# Patient Record
Sex: FEMALE | Race: WHITE | NOT HISPANIC OR LATINO | URBAN - METROPOLITAN AREA
[De-identification: names, ages, dates, MRNs, and addresses within clinical notes are randomized per-mention and may not be internally consistent; named-entity substitution may affect disease eponyms.]

---

## 2017-03-21 ENCOUNTER — OUTPATIENT (OUTPATIENT)
Dept: OUTPATIENT SERVICES | Facility: HOSPITAL | Age: 77
LOS: 1 days | Discharge: HOME | End: 2017-03-21

## 2017-06-27 DIAGNOSIS — Z12.31 ENCOUNTER FOR SCREENING MAMMOGRAM FOR MALIGNANT NEOPLASM OF BREAST: ICD-10-CM

## 2018-04-17 ENCOUNTER — OUTPATIENT (OUTPATIENT)
Dept: OUTPATIENT SERVICES | Facility: HOSPITAL | Age: 78
LOS: 1 days | Discharge: HOME | End: 2018-04-17

## 2019-04-23 ENCOUNTER — OUTPATIENT (OUTPATIENT)
Dept: OUTPATIENT SERVICES | Facility: HOSPITAL | Age: 79
LOS: 1 days | Discharge: HOME | End: 2019-04-23
Payer: MEDICARE

## 2019-04-23 DIAGNOSIS — Z12.31 ENCOUNTER FOR SCREENING MAMMOGRAM FOR MALIGNANT NEOPLASM OF BREAST: ICD-10-CM

## 2019-04-23 PROCEDURE — 77067 SCR MAMMO BI INCL CAD: CPT | Mod: 26

## 2019-04-23 PROCEDURE — 77063 BREAST TOMOSYNTHESIS BI: CPT | Mod: 26

## 2019-07-22 ENCOUNTER — APPOINTMENT (OUTPATIENT)
Dept: GASTROENTEROLOGY | Facility: CLINIC | Age: 79
End: 2019-07-22
Payer: MEDICARE

## 2019-07-22 VITALS
WEIGHT: 203 LBS | BODY MASS INDEX: 32.62 KG/M2 | DIASTOLIC BLOOD PRESSURE: 70 MMHG | HEIGHT: 66 IN | SYSTOLIC BLOOD PRESSURE: 140 MMHG | HEART RATE: 71 BPM

## 2019-07-22 DIAGNOSIS — G45.9 TRANSIENT CEREBRAL ISCHEMIC ATTACK, UNSPECIFIED: ICD-10-CM

## 2019-07-22 DIAGNOSIS — Z85.828 PERSONAL HISTORY OF OTHER MALIGNANT NEOPLASM OF SKIN: ICD-10-CM

## 2019-07-22 DIAGNOSIS — Z80.7 FAMILY HISTORY OF OTHER MALIGNANT NEOPLASMS OF LYMPHOID, HEMATOPOIETIC AND RELATED TISSUES: ICD-10-CM

## 2019-07-22 DIAGNOSIS — Z86.79 PERSONAL HISTORY OF OTHER DISEASES OF THE CIRCULATORY SYSTEM: ICD-10-CM

## 2019-07-22 DIAGNOSIS — Z78.9 OTHER SPECIFIED HEALTH STATUS: ICD-10-CM

## 2019-07-22 PROCEDURE — 99204 OFFICE O/P NEW MOD 45 MIN: CPT

## 2019-07-22 RX ORDER — MULTIVIT-MIN/FA/LYCOPEN/LUTEIN .4-300-25
TABLET ORAL
Refills: 0 | Status: ACTIVE | COMMUNITY

## 2019-07-22 NOTE — PHYSICAL EXAM
[General Appearance - Alert] : alert [Sclera] : the sclera and conjunctiva were normal [Thyroid Diffuse Enlargement] : the thyroid was not enlarged [Auscultation Breath Sounds / Voice Sounds] : lungs were clear to auscultation bilaterally [Heart Sounds] : normal S1 and S2 [Bowel Sounds] : normal bowel sounds [Abdomen Soft] : soft [Abdomen Tenderness] : non-tender [] : no hepato-splenomegaly [Abdomen Mass (___ Cm)] : no abdominal mass palpated [No CVA Tenderness] : no ~M costovertebral angle tenderness [Skin Color & Pigmentation] : normal skin color and pigmentation [Oriented To Time, Place, And Person] : oriented to person, place, and time

## 2019-07-22 NOTE — ASSESSMENT
[FreeTextEntry1] : 79 year old female pt average risk for CRC with personal hx of colon polyps at a colonoscopy done 5 years ago by Dr Mckeon, is here for surveillance colonoscopy.\par Denies any GI complaints.\par \par colonoscopy scheduled (traci)

## 2019-07-22 NOTE — HISTORY OF PRESENT ILLNESS
[de-identified] : 79 year old female pt average risk for CRC with personal hx of colon polyps at a colonoscopy done 5 years ago by Dr Olmedo, is here for surveillance colonoscopy.\par Denies any GI complaints.

## 2019-08-06 ENCOUNTER — APPOINTMENT (OUTPATIENT)
Dept: CARDIOLOGY | Facility: CLINIC | Age: 79
End: 2019-08-06
Payer: MEDICARE

## 2019-08-06 PROCEDURE — 93000 ELECTROCARDIOGRAM COMPLETE: CPT

## 2019-08-06 PROCEDURE — 99214 OFFICE O/P EST MOD 30 MIN: CPT

## 2019-09-17 ENCOUNTER — TRANSCRIPTION ENCOUNTER (OUTPATIENT)
Age: 79
End: 2019-09-17

## 2019-09-17 ENCOUNTER — RESULT REVIEW (OUTPATIENT)
Age: 79
End: 2019-09-17

## 2019-09-17 ENCOUNTER — OUTPATIENT (OUTPATIENT)
Dept: OUTPATIENT SERVICES | Facility: HOSPITAL | Age: 79
LOS: 1 days | Discharge: HOME | End: 2019-09-17
Payer: MEDICARE

## 2019-09-17 VITALS — RESPIRATION RATE: 15 BRPM | SYSTOLIC BLOOD PRESSURE: 120 MMHG | HEART RATE: 62 BPM | DIASTOLIC BLOOD PRESSURE: 64 MMHG

## 2019-09-17 VITALS
HEART RATE: 60 BPM | SYSTOLIC BLOOD PRESSURE: 160 MMHG | DIASTOLIC BLOOD PRESSURE: 74 MMHG | WEIGHT: 201.06 LBS | OXYGEN SATURATION: 100 % | TEMPERATURE: 99 F | RESPIRATION RATE: 20 BRPM | HEIGHT: 66 IN

## 2019-09-17 DIAGNOSIS — Z90.49 ACQUIRED ABSENCE OF OTHER SPECIFIED PARTS OF DIGESTIVE TRACT: Chronic | ICD-10-CM

## 2019-09-17 DIAGNOSIS — Z90.710 ACQUIRED ABSENCE OF BOTH CERVIX AND UTERUS: Chronic | ICD-10-CM

## 2019-09-17 PROCEDURE — 88305 TISSUE EXAM BY PATHOLOGIST: CPT | Mod: 26

## 2019-09-17 PROCEDURE — 45380 COLONOSCOPY AND BIOPSY: CPT

## 2019-09-17 NOTE — ASU DISCHARGE PLAN (ADULT/PEDIATRIC) - CARE PROVIDER_API CALL
Lizbeth Armenta)  Internal Medicine  [address] [place] [address]  Phone: [phone]  Fax: [phone]  Follow Up Time:

## 2019-09-17 NOTE — ASU DISCHARGE PLAN (ADULT/PEDIATRIC) - CALL YOUR DOCTOR IF YOU HAVE ANY OF THE FOLLOWING:
Fever greater than (need to indicate Fahrenheit or Celsius)/Bleeding that does not stop/Pain not relieved by Medications
none

## 2019-09-18 LAB — SURGICAL PATHOLOGY STUDY: SIGNIFICANT CHANGE UP

## 2019-09-23 DIAGNOSIS — Z12.11 ENCOUNTER FOR SCREENING FOR MALIGNANT NEOPLASM OF COLON: ICD-10-CM

## 2019-09-23 DIAGNOSIS — D12.0 BENIGN NEOPLASM OF CECUM: ICD-10-CM

## 2019-09-23 DIAGNOSIS — G62.9 POLYNEUROPATHY, UNSPECIFIED: ICD-10-CM

## 2019-09-23 DIAGNOSIS — K64.4 RESIDUAL HEMORRHOIDAL SKIN TAGS: ICD-10-CM

## 2019-09-23 DIAGNOSIS — I10 ESSENTIAL (PRIMARY) HYPERTENSION: ICD-10-CM

## 2019-09-23 DIAGNOSIS — K57.30 DIVERTICULOSIS OF LARGE INTESTINE WITHOUT PERFORATION OR ABSCESS WITHOUT BLEEDING: ICD-10-CM

## 2019-09-23 DIAGNOSIS — Z79.899 OTHER LONG TERM (CURRENT) DRUG THERAPY: ICD-10-CM

## 2019-09-23 DIAGNOSIS — Z88.2 ALLERGY STATUS TO SULFONAMIDES: ICD-10-CM

## 2019-10-25 NOTE — ASU PREOP CHECKLIST - LATEX ALLERGY
"[facility] Refill Protocol.  Steph GUALLPA RN    " no

## 2020-02-04 ENCOUNTER — APPOINTMENT (OUTPATIENT)
Dept: CARDIOLOGY | Facility: CLINIC | Age: 80
End: 2020-02-04
Payer: MEDICARE

## 2020-02-04 PROCEDURE — 93880 EXTRACRANIAL BILAT STUDY: CPT

## 2020-02-25 ENCOUNTER — APPOINTMENT (OUTPATIENT)
Dept: CARDIOLOGY | Facility: CLINIC | Age: 80
End: 2020-02-25
Payer: MEDICARE

## 2020-02-25 PROBLEM — I10 ESSENTIAL (PRIMARY) HYPERTENSION: Chronic | Status: ACTIVE | Noted: 2019-09-17

## 2020-02-25 PROCEDURE — 93000 ELECTROCARDIOGRAM COMPLETE: CPT

## 2020-02-25 PROCEDURE — 99214 OFFICE O/P EST MOD 30 MIN: CPT

## 2020-03-16 NOTE — ASU DISCHARGE PLAN (ADULT/PEDIATRIC) - ASU DISCHARGE DATE/TIME
[FreeTextEntry1] : I, Rehan Coleman, 17-Sep-2019 10:11 17-Sep-2019 11:45

## 2020-10-09 ENCOUNTER — APPOINTMENT (OUTPATIENT)
Dept: CARDIOLOGY | Facility: CLINIC | Age: 80
End: 2020-10-09
Payer: MEDICARE

## 2020-10-09 PROCEDURE — 93306 TTE W/DOPPLER COMPLETE: CPT

## 2020-10-20 ENCOUNTER — APPOINTMENT (OUTPATIENT)
Dept: CARDIOLOGY | Facility: CLINIC | Age: 80
End: 2020-10-20
Payer: MEDICARE

## 2020-10-20 VITALS
SYSTOLIC BLOOD PRESSURE: 122 MMHG | WEIGHT: 211 LBS | BODY MASS INDEX: 33.91 KG/M2 | HEART RATE: 67 BPM | HEIGHT: 66 IN | DIASTOLIC BLOOD PRESSURE: 80 MMHG | TEMPERATURE: 98.6 F

## 2020-10-20 DIAGNOSIS — Z00.00 ENCOUNTER FOR GENERAL ADULT MEDICAL EXAMINATION W/OUT ABNORMAL FINDINGS: ICD-10-CM

## 2020-10-20 PROCEDURE — 99214 OFFICE O/P EST MOD 30 MIN: CPT

## 2020-10-20 PROCEDURE — 93000 ELECTROCARDIOGRAM COMPLETE: CPT

## 2020-10-20 PROCEDURE — 99072 ADDL SUPL MATRL&STAF TM PHE: CPT

## 2020-10-20 RX ORDER — POLYETHYLENE GLYCOL 3350 AND ELECTROLYTES WITH LEMON FLAVOR 236; 22.74; 6.74; 5.86; 2.97 G/4L; G/4L; G/4L; G/4L; G/4L
236 POWDER, FOR SOLUTION ORAL
Qty: 1 | Refills: 0 | Status: COMPLETED | COMMUNITY
Start: 2019-07-22 | End: 2020-10-20

## 2021-04-07 ENCOUNTER — APPOINTMENT (OUTPATIENT)
Dept: NEUROLOGY | Facility: CLINIC | Age: 81
End: 2021-04-07

## 2021-05-11 ENCOUNTER — NON-APPOINTMENT (OUTPATIENT)
Age: 81
End: 2021-05-11

## 2021-05-25 ENCOUNTER — APPOINTMENT (OUTPATIENT)
Dept: CARDIOLOGY | Facility: CLINIC | Age: 81
End: 2021-05-25
Payer: MEDICARE

## 2021-05-25 ENCOUNTER — RESULT CHARGE (OUTPATIENT)
Age: 81
End: 2021-05-25

## 2021-05-25 VITALS
BODY MASS INDEX: 32.95 KG/M2 | WEIGHT: 205 LBS | HEART RATE: 66 BPM | HEIGHT: 66 IN | SYSTOLIC BLOOD PRESSURE: 120 MMHG | DIASTOLIC BLOOD PRESSURE: 70 MMHG | TEMPERATURE: 98 F

## 2021-05-25 PROCEDURE — 99213 OFFICE O/P EST LOW 20 MIN: CPT

## 2021-05-25 PROCEDURE — 93000 ELECTROCARDIOGRAM COMPLETE: CPT

## 2021-06-10 ENCOUNTER — OUTPATIENT (OUTPATIENT)
Dept: OUTPATIENT SERVICES | Facility: HOSPITAL | Age: 81
LOS: 1 days | Discharge: HOME | End: 2021-06-10
Payer: MEDICARE

## 2021-06-10 VITALS
HEART RATE: 60 BPM | WEIGHT: 205.03 LBS | TEMPERATURE: 97 F | SYSTOLIC BLOOD PRESSURE: 195 MMHG | DIASTOLIC BLOOD PRESSURE: 76 MMHG | HEIGHT: 66 IN | RESPIRATION RATE: 17 BRPM | OXYGEN SATURATION: 96 %

## 2021-06-10 DIAGNOSIS — Z90.710 ACQUIRED ABSENCE OF BOTH CERVIX AND UTERUS: Chronic | ICD-10-CM

## 2021-06-10 DIAGNOSIS — S83.231D COMPLEX TEAR OF MEDIAL MENISCUS, CURRENT INJURY, RIGHT KNEE, SUBSEQUENT ENCOUNTER: ICD-10-CM

## 2021-06-10 DIAGNOSIS — Z90.49 ACQUIRED ABSENCE OF OTHER SPECIFIED PARTS OF DIGESTIVE TRACT: Chronic | ICD-10-CM

## 2021-06-10 DIAGNOSIS — Z98.890 OTHER SPECIFIED POSTPROCEDURAL STATES: Chronic | ICD-10-CM

## 2021-06-10 DIAGNOSIS — Z01.818 ENCOUNTER FOR OTHER PREPROCEDURAL EXAMINATION: ICD-10-CM

## 2021-06-10 LAB
ALBUMIN SERPL ELPH-MCNC: 4.3 G/DL — SIGNIFICANT CHANGE UP (ref 3.5–5.2)
ALP SERPL-CCNC: 51 U/L — SIGNIFICANT CHANGE UP (ref 30–115)
ALT FLD-CCNC: 17 U/L — SIGNIFICANT CHANGE UP (ref 0–41)
ANION GAP SERPL CALC-SCNC: 7 MMOL/L — SIGNIFICANT CHANGE UP (ref 7–14)
APTT BLD: 28 SEC — SIGNIFICANT CHANGE UP (ref 27–39.2)
AST SERPL-CCNC: 21 U/L — SIGNIFICANT CHANGE UP (ref 0–41)
BASOPHILS # BLD AUTO: 0.05 K/UL — SIGNIFICANT CHANGE UP (ref 0–0.2)
BASOPHILS NFR BLD AUTO: 0.6 % — SIGNIFICANT CHANGE UP (ref 0–1)
BILIRUB SERPL-MCNC: 0.3 MG/DL — SIGNIFICANT CHANGE UP (ref 0.2–1.2)
BUN SERPL-MCNC: 19 MG/DL — SIGNIFICANT CHANGE UP (ref 10–20)
CALCIUM SERPL-MCNC: 9.7 MG/DL — SIGNIFICANT CHANGE UP (ref 8.5–10.1)
CHLORIDE SERPL-SCNC: 103 MMOL/L — SIGNIFICANT CHANGE UP (ref 98–110)
CO2 SERPL-SCNC: 29 MMOL/L — SIGNIFICANT CHANGE UP (ref 17–32)
CREAT SERPL-MCNC: 0.9 MG/DL — SIGNIFICANT CHANGE UP (ref 0.7–1.5)
EOSINOPHIL # BLD AUTO: 0.17 K/UL — SIGNIFICANT CHANGE UP (ref 0–0.7)
EOSINOPHIL NFR BLD AUTO: 1.9 % — SIGNIFICANT CHANGE UP (ref 0–8)
GLUCOSE SERPL-MCNC: 79 MG/DL — SIGNIFICANT CHANGE UP (ref 70–99)
HCT VFR BLD CALC: 40 % — SIGNIFICANT CHANGE UP (ref 37–47)
HGB BLD-MCNC: 13.1 G/DL — SIGNIFICANT CHANGE UP (ref 12–16)
IMM GRANULOCYTES NFR BLD AUTO: 0.3 % — SIGNIFICANT CHANGE UP (ref 0.1–0.3)
INR BLD: 0.97 RATIO — SIGNIFICANT CHANGE UP (ref 0.65–1.3)
LYMPHOCYTES # BLD AUTO: 2.2 K/UL — SIGNIFICANT CHANGE UP (ref 1.2–3.4)
LYMPHOCYTES # BLD AUTO: 24.4 % — SIGNIFICANT CHANGE UP (ref 20.5–51.1)
MCHC RBC-ENTMCNC: 29.6 PG — SIGNIFICANT CHANGE UP (ref 27–31)
MCHC RBC-ENTMCNC: 32.8 G/DL — SIGNIFICANT CHANGE UP (ref 32–37)
MCV RBC AUTO: 90.3 FL — SIGNIFICANT CHANGE UP (ref 81–99)
MONOCYTES # BLD AUTO: 0.92 K/UL — HIGH (ref 0.1–0.6)
MONOCYTES NFR BLD AUTO: 10.2 % — HIGH (ref 1.7–9.3)
NEUTROPHILS # BLD AUTO: 5.66 K/UL — SIGNIFICANT CHANGE UP (ref 1.4–6.5)
NEUTROPHILS NFR BLD AUTO: 62.6 % — SIGNIFICANT CHANGE UP (ref 42.2–75.2)
NRBC # BLD: 0 /100 WBCS — SIGNIFICANT CHANGE UP (ref 0–0)
PLATELET # BLD AUTO: 256 K/UL — SIGNIFICANT CHANGE UP (ref 130–400)
POTASSIUM SERPL-MCNC: 4.5 MMOL/L — SIGNIFICANT CHANGE UP (ref 3.5–5)
POTASSIUM SERPL-SCNC: 4.5 MMOL/L — SIGNIFICANT CHANGE UP (ref 3.5–5)
PROT SERPL-MCNC: 6.8 G/DL — SIGNIFICANT CHANGE UP (ref 6–8)
PROTHROM AB SERPL-ACNC: 11.1 SEC — SIGNIFICANT CHANGE UP (ref 9.95–12.87)
RBC # BLD: 4.43 M/UL — SIGNIFICANT CHANGE UP (ref 4.2–5.4)
RBC # FLD: 13.4 % — SIGNIFICANT CHANGE UP (ref 11.5–14.5)
SODIUM SERPL-SCNC: 139 MMOL/L — SIGNIFICANT CHANGE UP (ref 135–146)
WBC # BLD: 9.03 K/UL — SIGNIFICANT CHANGE UP (ref 4.8–10.8)
WBC # FLD AUTO: 9.03 K/UL — SIGNIFICANT CHANGE UP (ref 4.8–10.8)

## 2021-06-10 PROCEDURE — 93010 ELECTROCARDIOGRAM REPORT: CPT

## 2021-06-10 PROCEDURE — 71046 X-RAY EXAM CHEST 2 VIEWS: CPT | Mod: 26

## 2021-06-10 NOTE — H&P PST ADULT - REASON FOR ADMISSION
80 Y/O FEMALE HERE FOR PRE-ADMISSION SURGICAL TESTING. PATIENT REPORTS SHE HAS BEEN C/O RIGHT KNEE PAIN SINCE 11/2019. HAD A CORTISONE INJECTION DONE AT THAT TIME AND A GEL INJECTION IN 12/2020. PAIN PERSISTED. MRI 5/28/21 REVEALED + TORN MENISCUS RIGHT KNEE.  NOW FOR SCHEDULED PROCEDURE.

## 2021-06-10 NOTE — H&P PST ADULT - NSICDXPASTMEDICALHX_GEN_ALL_CORE_FT
PAST MEDICAL HISTORY:  Afib AGE 72 X1 EPISODE    Asymptomatic hypertension     Early stage skin cancer SQAMOUS AND BASAL CELL SKIN CA    Low back pain BULGING DISCS LUMBAR SPINE    Mitral regurgitation     OA (osteoarthritis)     Peripheral neuropathy

## 2021-06-10 NOTE — H&P PST ADULT - NSICDXPASTSURGICALHX_GEN_ALL_CORE_FT
PAST SURGICAL HISTORY:  H/O abdominal hysterectomy     History of cholecystectomy     History of surgery REMOVAL OF RIGHT ANKLE SKIN CANCER

## 2021-06-10 NOTE — H&P PST ADULT - NSICDXFAMILYHX_GEN_ALL_CORE_FT
FAMILY HISTORY:  Father  Still living? No  FH: renal failure, Age at diagnosis: Age Unknown    Mother  Still living? No  FH: kidney cancer, Age at diagnosis: Age Unknown  FH: lung cancer, Age at diagnosis: Age Unknown

## 2021-06-10 NOTE — H&P PST ADULT - HISTORY OF PRESENT ILLNESS
PATIENT DENIES CHEST PAIN, SHORTNESS OF BREATH, PALPITATIONS, COUGHING, FEVER, DYSURIA.  CAN WALK UP 2-3 FLIGHTS OF STEPS WITHOUT SOB, ONLY LIMITED TO KNEE AND BACK PAIN.    NO COUGH, FEVER, SORE THROAT, HEADACHE, LOSS OF TASTE OR SMELL. NO KNOWN EXPOSURE TO ANYONE WITH COVID. PATIENT WAS INSTRUCTED TO ISOLATE FROM NOW UNTIL THE SURGERY.  SHE IS FULLY VACCINATED WITH MODERNA    Anesthesia Alert  + Difficult Airway (CLASS IV)  NO--History of neck surgery or radiation  NO--Limited ROM of neck  NO--History of Malignant hyperthermia  NO--Personal or family history of Pseudocholinesterase deficiency  NO--Prior Anesthesia Complication  NO--Latex Allergy  NO--Loose teeth  NO--History of Rheumatoid Arthritis  ?? YOLY (+ SNORRING)  On baby ASA.

## 2021-06-10 NOTE — H&P PST ADULT - NSANTHOSAYNRD_GEN_A_CORE
No. YOLY screening performed.  STOP BANG Legend: 0-2 = LOW Risk; 3-4 = INTERMEDIATE Risk; 5-8 = HIGH Risk

## 2021-06-15 ENCOUNTER — LABORATORY RESULT (OUTPATIENT)
Age: 81
End: 2021-06-15

## 2021-06-15 ENCOUNTER — OUTPATIENT (OUTPATIENT)
Dept: OUTPATIENT SERVICES | Facility: HOSPITAL | Age: 81
LOS: 1 days | Discharge: HOME | End: 2021-06-15

## 2021-06-15 DIAGNOSIS — Z90.49 ACQUIRED ABSENCE OF OTHER SPECIFIED PARTS OF DIGESTIVE TRACT: Chronic | ICD-10-CM

## 2021-06-15 DIAGNOSIS — Z11.59 ENCOUNTER FOR SCREENING FOR OTHER VIRAL DISEASES: ICD-10-CM

## 2021-06-15 DIAGNOSIS — Z98.890 OTHER SPECIFIED POSTPROCEDURAL STATES: Chronic | ICD-10-CM

## 2021-06-15 DIAGNOSIS — Z90.710 ACQUIRED ABSENCE OF BOTH CERVIX AND UTERUS: Chronic | ICD-10-CM

## 2021-06-15 PROBLEM — M54.5 LOW BACK PAIN: Chronic | Status: ACTIVE | Noted: 2021-06-10

## 2021-06-15 PROBLEM — I34.0 NONRHEUMATIC MITRAL (VALVE) INSUFFICIENCY: Chronic | Status: ACTIVE | Noted: 2021-06-10

## 2021-06-15 PROBLEM — G62.9 POLYNEUROPATHY, UNSPECIFIED: Chronic | Status: ACTIVE | Noted: 2021-06-10

## 2021-06-15 PROBLEM — I48.91 UNSPECIFIED ATRIAL FIBRILLATION: Chronic | Status: ACTIVE | Noted: 2019-09-17

## 2021-06-15 PROBLEM — M19.90 UNSPECIFIED OSTEOARTHRITIS, UNSPECIFIED SITE: Chronic | Status: ACTIVE | Noted: 2021-06-10

## 2021-06-15 PROBLEM — D04.9 CARCINOMA IN SITU OF SKIN, UNSPECIFIED: Chronic | Status: ACTIVE | Noted: 2019-09-17

## 2021-06-17 NOTE — ASU PATIENT PROFILE, ADULT - NS TRANSFER PATIENT BELONGINGS
Pt arrives to ED with c/o migraine starting Monday evening and vomiting.  Known hx of migraines and chronic pain, prescribed hydrocodone but cannot keep anything down.  Out of her prescription of zofran.   
walker, purse/Other belongings/Clothing

## 2021-06-17 NOTE — ASU PATIENT PROFILE, ADULT - PSH
H/O abdominal hysterectomy    History of cholecystectomy    History of surgery  REMOVAL OF RIGHT ANKLE SKIN CANCER  History of tonsillectomy

## 2021-06-17 NOTE — ASU PATIENT PROFILE, ADULT - PMH
Afib  AGE 72 X1 EPISODE  Asymptomatic hypertension    Early stage skin cancer  SQAMOUS AND BASAL CELL SKIN CA  Low back pain  BULGING DISCS LUMBAR SPINE  Mitral regurgitation    Murmur, heart    OA (osteoarthritis)    Peripheral neuropathy

## 2021-06-18 ENCOUNTER — RESULT REVIEW (OUTPATIENT)
Age: 81
End: 2021-06-18

## 2021-06-18 ENCOUNTER — OUTPATIENT (OUTPATIENT)
Dept: OUTPATIENT SERVICES | Facility: HOSPITAL | Age: 81
LOS: 1 days | Discharge: HOME | End: 2021-06-18
Payer: MEDICARE

## 2021-06-18 VITALS
DIASTOLIC BLOOD PRESSURE: 71 MMHG | OXYGEN SATURATION: 96 % | HEART RATE: 69 BPM | WEIGHT: 205.03 LBS | HEIGHT: 66 IN | SYSTOLIC BLOOD PRESSURE: 160 MMHG | RESPIRATION RATE: 18 BRPM | TEMPERATURE: 98 F

## 2021-06-18 VITALS
RESPIRATION RATE: 18 BRPM | SYSTOLIC BLOOD PRESSURE: 146 MMHG | HEART RATE: 76 BPM | OXYGEN SATURATION: 93 % | DIASTOLIC BLOOD PRESSURE: 65 MMHG

## 2021-06-18 DIAGNOSIS — Z90.710 ACQUIRED ABSENCE OF BOTH CERVIX AND UTERUS: Chronic | ICD-10-CM

## 2021-06-18 DIAGNOSIS — Z90.49 ACQUIRED ABSENCE OF OTHER SPECIFIED PARTS OF DIGESTIVE TRACT: Chronic | ICD-10-CM

## 2021-06-18 DIAGNOSIS — Z98.890 OTHER SPECIFIED POSTPROCEDURAL STATES: Chronic | ICD-10-CM

## 2021-06-18 DIAGNOSIS — Z90.89 ACQUIRED ABSENCE OF OTHER ORGANS: Chronic | ICD-10-CM

## 2021-06-18 PROCEDURE — 88304 TISSUE EXAM BY PATHOLOGIST: CPT | Mod: 26

## 2021-06-18 RX ORDER — MORPHINE SULFATE 50 MG/1
2 CAPSULE, EXTENDED RELEASE ORAL EVERY 6 HOURS
Refills: 0 | Status: DISCONTINUED | OUTPATIENT
Start: 2021-06-18 | End: 2021-06-18

## 2021-06-18 RX ORDER — SODIUM CHLORIDE 9 MG/ML
1000 INJECTION, SOLUTION INTRAVENOUS
Refills: 0 | Status: DISCONTINUED | OUTPATIENT
Start: 2021-06-18 | End: 2021-07-02

## 2021-06-18 RX ORDER — OXYCODONE AND ACETAMINOPHEN 5; 325 MG/1; MG/1
1 TABLET ORAL EVERY 4 HOURS
Refills: 0 | Status: DISCONTINUED | OUTPATIENT
Start: 2021-06-18 | End: 2021-06-18

## 2021-06-18 RX ORDER — ACETAMINOPHEN 500 MG
1000 TABLET ORAL ONCE
Refills: 0 | Status: COMPLETED | OUTPATIENT
Start: 2021-06-18 | End: 2021-06-18

## 2021-06-18 RX ORDER — ONDANSETRON 8 MG/1
4 TABLET, FILM COATED ORAL ONCE
Refills: 0 | Status: DISCONTINUED | OUTPATIENT
Start: 2021-06-18 | End: 2021-07-02

## 2021-06-18 RX ORDER — HYDROMORPHONE HYDROCHLORIDE 2 MG/ML
0.5 INJECTION INTRAMUSCULAR; INTRAVENOUS; SUBCUTANEOUS
Refills: 0 | Status: DISCONTINUED | OUTPATIENT
Start: 2021-06-18 | End: 2021-06-18

## 2021-06-18 RX ORDER — OXYCODONE HYDROCHLORIDE 5 MG/1
5 TABLET ORAL ONCE
Refills: 0 | Status: DISCONTINUED | OUTPATIENT
Start: 2021-06-18 | End: 2021-06-18

## 2021-06-18 RX ADMIN — HYDROMORPHONE HYDROCHLORIDE 0.5 MILLIGRAM(S): 2 INJECTION INTRAMUSCULAR; INTRAVENOUS; SUBCUTANEOUS at 14:48

## 2021-06-18 RX ADMIN — OXYCODONE HYDROCHLORIDE 5 MILLIGRAM(S): 5 TABLET ORAL at 15:48

## 2021-06-18 RX ADMIN — HYDROMORPHONE HYDROCHLORIDE 0.5 MILLIGRAM(S): 2 INJECTION INTRAMUSCULAR; INTRAVENOUS; SUBCUTANEOUS at 14:49

## 2021-06-18 RX ADMIN — Medication 1000 MILLIGRAM(S): at 15:45

## 2021-06-18 RX ADMIN — SODIUM CHLORIDE 100 MILLILITER(S): 9 INJECTION, SOLUTION INTRAVENOUS at 15:01

## 2021-06-18 RX ADMIN — HYDROMORPHONE HYDROCHLORIDE 0.5 MILLIGRAM(S): 2 INJECTION INTRAMUSCULAR; INTRAVENOUS; SUBCUTANEOUS at 15:01

## 2021-06-18 RX ADMIN — Medication 400 MILLIGRAM(S): at 14:57

## 2021-06-18 RX ADMIN — HYDROMORPHONE HYDROCHLORIDE 0.5 MILLIGRAM(S): 2 INJECTION INTRAMUSCULAR; INTRAVENOUS; SUBCUTANEOUS at 14:37

## 2021-06-18 NOTE — ASU DISCHARGE PLAN (ADULT/PEDIATRIC) - ASU DC SPECIAL INSTRUCTIONSFT
Please maintain your dressing for 48 hours, it may be removed on Sunday and you can shower at that time.     You may put band-aids over your stitches on Sunday.

## 2021-06-18 NOTE — CHART NOTE - NSCHARTNOTEFT_GEN_A_CORE
PACU ANESTHESIA ADMISSION NOTE      Procedure: Knee arthroscopy      Post op diagnosis:  Medial meniscus tear        ____  Intubated  TV:______       Rate: ______      FiO2: ______    __x__  Patent Airway    __x__  Full return of protective reflexes    __x__  Full recovery from anesthesia / back to baseline     Vitals:   T:  97.5        R:  16                BP:     163/68             Sat:   95%               P: 85      Mental Status:  __x__ Awake   __x___ Alert   _____ Drowsy   _____ Sedated    Nausea/Vomiting:  __x__ NO  ______Yes,   See Post - Op Orders          Pain Scale (0-10):  __0___    Treatment: ____ None    ___x_ See Post - Op/PCA Orders    Post - Operative Fluids:   ____ Oral   __x__ See Post - Op Orders    Plan: Discharge:   __x__Home       _____Floor     _____Critical Care    _____  Other:_________________    Comments:  pt tolerated procedure well, pt transferred to PACU and report was given to PACU RN, vital signs are stable and pt shows no signs of distress. no anesthesia complications, discharge pt when criteria met.

## 2021-06-23 LAB — SURGICAL PATHOLOGY STUDY: SIGNIFICANT CHANGE UP

## 2021-06-24 DIAGNOSIS — I34.0 NONRHEUMATIC MITRAL (VALVE) INSUFFICIENCY: ICD-10-CM

## 2021-06-24 DIAGNOSIS — Z79.82 LONG TERM (CURRENT) USE OF ASPIRIN: ICD-10-CM

## 2021-06-24 DIAGNOSIS — I10 ESSENTIAL (PRIMARY) HYPERTENSION: ICD-10-CM

## 2021-06-24 DIAGNOSIS — M17.11 UNILATERAL PRIMARY OSTEOARTHRITIS, RIGHT KNEE: ICD-10-CM

## 2021-06-24 DIAGNOSIS — Z91.040 LATEX ALLERGY STATUS: ICD-10-CM

## 2021-06-24 DIAGNOSIS — Z85.828 PERSONAL HISTORY OF OTHER MALIGNANT NEOPLASM OF SKIN: ICD-10-CM

## 2021-06-24 DIAGNOSIS — I48.91 UNSPECIFIED ATRIAL FIBRILLATION: ICD-10-CM

## 2021-06-24 DIAGNOSIS — R01.1 CARDIAC MURMUR, UNSPECIFIED: ICD-10-CM

## 2021-06-24 DIAGNOSIS — M23.221 DERANGEMENT OF POSTERIOR HORN OF MEDIAL MENISCUS DUE TO OLD TEAR OR INJURY, RIGHT KNEE: ICD-10-CM

## 2021-06-24 DIAGNOSIS — G62.9 POLYNEUROPATHY, UNSPECIFIED: ICD-10-CM

## 2021-06-24 DIAGNOSIS — M23.261 DERANGEMENT OF OTHER LATERAL MENISCUS DUE TO OLD TEAR OR INJURY, RIGHT KNEE: ICD-10-CM

## 2021-06-24 DIAGNOSIS — Z88.2 ALLERGY STATUS TO SULFONAMIDES: ICD-10-CM

## 2021-09-22 ENCOUNTER — OUTPATIENT (OUTPATIENT)
Dept: OUTPATIENT SERVICES | Facility: HOSPITAL | Age: 81
LOS: 1 days | Discharge: HOME | End: 2021-09-22
Payer: MEDICARE

## 2021-09-22 DIAGNOSIS — Z12.31 ENCOUNTER FOR SCREENING MAMMOGRAM FOR MALIGNANT NEOPLASM OF BREAST: ICD-10-CM

## 2021-09-22 DIAGNOSIS — Z90.710 ACQUIRED ABSENCE OF BOTH CERVIX AND UTERUS: Chronic | ICD-10-CM

## 2021-09-22 DIAGNOSIS — Z98.890 OTHER SPECIFIED POSTPROCEDURAL STATES: Chronic | ICD-10-CM

## 2021-09-22 DIAGNOSIS — Z90.89 ACQUIRED ABSENCE OF OTHER ORGANS: Chronic | ICD-10-CM

## 2021-09-22 DIAGNOSIS — Z90.49 ACQUIRED ABSENCE OF OTHER SPECIFIED PARTS OF DIGESTIVE TRACT: Chronic | ICD-10-CM

## 2021-09-22 PROBLEM — R01.1 CARDIAC MURMUR, UNSPECIFIED: Chronic | Status: ACTIVE | Noted: 2021-06-18

## 2021-09-22 PROCEDURE — 77067 SCR MAMMO BI INCL CAD: CPT | Mod: 26

## 2021-09-22 PROCEDURE — 77063 BREAST TOMOSYNTHESIS BI: CPT | Mod: 26

## 2021-10-01 NOTE — H&P PST ADULT - OCCUPATION
Patient is calling and asking for a prescription for Acutane for her acne. Patient was advised that we would ask .  Please call and advise patient at [phone] RETIRED

## 2021-10-13 ENCOUNTER — APPOINTMENT (OUTPATIENT)
Dept: CARDIOLOGY | Facility: CLINIC | Age: 81
End: 2021-10-13
Payer: MEDICARE

## 2021-10-13 ENCOUNTER — TRANSCRIPTION ENCOUNTER (OUTPATIENT)
Age: 81
End: 2021-10-13

## 2021-10-13 PROCEDURE — 93306 TTE W/DOPPLER COMPLETE: CPT

## 2021-10-28 ENCOUNTER — APPOINTMENT (OUTPATIENT)
Dept: CARDIOLOGY | Facility: CLINIC | Age: 81
End: 2021-10-28
Payer: MEDICARE

## 2021-10-28 ENCOUNTER — RESULT CHARGE (OUTPATIENT)
Age: 81
End: 2021-10-28

## 2021-10-28 VITALS
HEIGHT: 66 IN | BODY MASS INDEX: 32.95 KG/M2 | WEIGHT: 205 LBS | DIASTOLIC BLOOD PRESSURE: 70 MMHG | HEART RATE: 66 BPM | OXYGEN SATURATION: 98 % | TEMPERATURE: 98.2 F | SYSTOLIC BLOOD PRESSURE: 120 MMHG

## 2021-10-28 PROCEDURE — 99213 OFFICE O/P EST LOW 20 MIN: CPT

## 2021-10-28 PROCEDURE — 93000 ELECTROCARDIOGRAM COMPLETE: CPT

## 2021-10-28 RX ORDER — ENALAPRIL MALEATE 10 MG/1
10 TABLET ORAL DAILY
Refills: 0 | Status: ACTIVE | COMMUNITY

## 2021-10-28 RX ORDER — ASPIRIN 325 MG
81 TABLET ORAL DAILY
Refills: 0 | Status: ACTIVE | COMMUNITY

## 2021-11-02 ENCOUNTER — APPOINTMENT (OUTPATIENT)
Dept: UROGYNECOLOGY | Facility: CLINIC | Age: 81
End: 2021-11-02
Payer: MEDICARE

## 2021-11-02 VITALS
HEIGHT: 66 IN | BODY MASS INDEX: 33.59 KG/M2 | SYSTOLIC BLOOD PRESSURE: 145 MMHG | WEIGHT: 209 LBS | HEART RATE: 84 BPM | DIASTOLIC BLOOD PRESSURE: 84 MMHG

## 2021-11-02 DIAGNOSIS — Z87.440 PERSONAL HISTORY OF URINARY (TRACT) INFECTIONS: ICD-10-CM

## 2021-11-02 DIAGNOSIS — Z82.49 FAMILY HISTORY OF ISCHEMIC HEART DISEASE AND OTHER DISEASES OF THE CIRCULATORY SYSTEM: ICD-10-CM

## 2021-11-02 PROCEDURE — 99204 OFFICE O/P NEW MOD 45 MIN: CPT | Mod: 25

## 2021-11-02 PROCEDURE — 51701 INSERT BLADDER CATHETER: CPT

## 2021-11-02 NOTE — PHYSICAL EXAM
[Chaperone Present] : A chaperone was present in the examining room during all aspects of the physical examination [FreeTextEntry1] : Void: 50 cc\par \par PVR: 60 cc\par \par Urethra was prepped in sterile fashion and then a sterile catheter was used by me to drain the bladder.\par \par Well healed incision: RUQ\par \par neg empty cough stress test\par \par + atrophy\par \par no urethral caruncle\par \par no vestibular tenderness\par \par + small vaginal polyp just proximal tot the urethral meatus\par \par no prolapse\par \par + urethral hypermobility\par \par no pelvic floor dysfunction\par \par no urethral tenderness\par \par no bladder tenderness\par \par surgically absent cervix\par \par surgically absent uterus\par \par good sphincter tone\par \par decreased rectal squeeze\par \par intact sacral nerves\par \par 1/5 Kegel\par

## 2021-11-02 NOTE — ASSESSMENT
[FreeTextEntry1] : Overactive bladder syndrome -\par Discussed etiology of the condition with the patient. Discussed management options, including first line management options consisting of diet and lifestyle modifications, second line options consisting of medications, and third line options. Patient will start with bladder diet and pelvic floor PT. She will return in 3 months for reevaluation.\par \par Vaginal atrophy -\par Discussed etiology and treatment options with patient. Discussed R/B/A of estrogen vaginal cream. Patient will start using as follows:\par Place a pea size (0.5 grams or less) dab of Estrogen vaginal cream using finger (NOT the applicator) into the vagina 3 times a week ( Monday, Wednesday, Friday)\par \par Nocturia - \par Discussed with patient importance of stopping all fluid intake at least 2-3 hours before bedtime.\par

## 2021-11-02 NOTE — COUNSELING
[FreeTextEntry1] : Please return in about 3 months.\par \par \par Here is the information for the pelvic floor physical therapy place:\par [facility] \par [address]\[place] [address]\par \par \par Place a pea size (0.5 grams or less) dab of Estrogen vaginal cream using finger (NOT the applicator) into the vagina 3 times a week ( Monday, Wednesday, Friday)\par \par \par To reduce urinary frequency at night, please restrict all fluid intake 2-3 hours prior to bedtime.\par \par \par For Urgency, Frequency and Urge related incontinence.\par \par Please decrease or stop the use of the following:\par \par 1. Coffee (Caffeinated and decaffeinated)\par \par 2. Teas (Caffeinated, decaffeinated, Ice tea, and green teas\par \par 3. All sodas (Caffeinated, decaffeinated, energy drinks)\par \par 4. All carbonated drinks including seltzer water\par \par 5. All citric fruit juices\par \par 6. Water with lemon or lime\par \par 7. Spicy foods\par \par 8. Tomato Sauce based foods\par \par 9. Chocolate and chocolate containing products\par \par 10. All alcohol\par

## 2021-11-02 NOTE — HISTORY OF PRESENT ILLNESS
[FreeTextEntry1] : 81 year para 3 ( x3) presents with complaints of not being able to control her bladder and not being able to sleep through the night. She has a hard time getting to the bathroom on time. This has been going on for several years. She has tried kegel exercises.\par \par Pelvic organ prolapse: no bulge, no pressure/heaviness\par \par Stress urinary incontinence: 0 x/week  no prior incontinence procedures\par \par Overactive bladder syndrome: daily frequency 10 x/day, 3-4 x/night,  + urgency,  7-14 x/week UUI episodes,    6-7 pads/day     Bladder irritants include coffee, tea, lemon water,    Prior OAB meds no\par \par Voiding dysfunction: no Incomplete bladder emptying, no hesitancy\par \par Lower urinary tract/vaginal symptoms: 1 UTIs per year, no hematuria, + dysuria, no bladder pain\par \par 7-14 BM/week   occasional constipation   Fecal incontinence no\par \par Sexually active no    Pelvic pain no    Vaginal dryness no    LMP \par \par Hx of hysterectomy in  for heavy bleeding\par

## 2021-11-03 LAB
APPEARANCE: CLEAR
BILIRUBIN URINE: NEGATIVE
BLOOD URINE: NEGATIVE
COLOR: YELLOW
GLUCOSE QUALITATIVE U: NEGATIVE
KETONES URINE: NEGATIVE
LEUKOCYTE ESTERASE URINE: NEGATIVE
NITRITE URINE: NEGATIVE
PH URINE: 5.5
PROTEIN URINE: NEGATIVE
SPECIFIC GRAVITY URINE: 1.02
UROBILINOGEN URINE: NORMAL

## 2021-11-08 LAB — URINE CULTURE <10: ABNORMAL

## 2022-02-08 ENCOUNTER — APPOINTMENT (OUTPATIENT)
Dept: UROGYNECOLOGY | Facility: CLINIC | Age: 82
End: 2022-02-08
Payer: MEDICARE

## 2022-02-08 VITALS
WEIGHT: 209 LBS | DIASTOLIC BLOOD PRESSURE: 82 MMHG | SYSTOLIC BLOOD PRESSURE: 143 MMHG | BODY MASS INDEX: 33.59 KG/M2 | HEART RATE: 81 BPM | HEIGHT: 66 IN

## 2022-02-08 VITALS — DIASTOLIC BLOOD PRESSURE: 82 MMHG | SYSTOLIC BLOOD PRESSURE: 127 MMHG | HEART RATE: 80 BPM

## 2022-02-08 PROCEDURE — 99215 OFFICE O/P EST HI 40 MIN: CPT

## 2022-02-08 NOTE — HISTORY OF PRESENT ILLNESS
[FreeTextEntry1] : 82 year old para 3 with urinary urgency, UUI, nocturia, and vaginal atrophy.\par Positive glaucoma, Dr. Solorzano is her ophthalmologist.  Decreased bladder irritants which has mildly improved symptoms. \par  \par Voids 10 / day, 4-5/night.  positive urgency, 0 MARKUS/week, 7-14 UUI/week, 1? UTI's in past 6 months,    follows bladder diet.\par 7 BM/week. no FI, no fiber, no Miralax \par no vaginal estrogen. no vaginal bleeding, no vaginal/pelvic pain. \par  \par Med/Surg Hx\par Hx of hysterectomy in  for heavy bleeding\par \par 2022 urine culture positive, s/p antibiotics\par \par From initial visit:\par 81 year para 3 ( x3) presents with complaints of not being able to control her bladder and not being able to sleep through the night. She has a hard time getting to the bathroom on time. This has been going on for several years. She has tried kegel exercises.\par \par Pelvic organ prolapse: no bulge, no pressure/heaviness\par \par Stress urinary incontinence: 0 x/week  no prior incontinence procedures\par \par Overactive bladder syndrome: daily frequency 10 x/day, 3-4 x/night,  + urgency,  7-14 x/week UUI episodes,    6-7 pads/day     Bladder irritants include coffee, tea, lemon water,    Prior OAB meds no\par \par Voiding dysfunction: no Incomplete bladder emptying, no hesitancy\par \par Lower urinary tract/vaginal symptoms: 1 UTIs per year, no hematuria, + dysuria, no bladder pain\par \par 7-14 BM/week   occasional constipation   Fecal incontinence no\par \par Sexually active no    Pelvic pain no    Vaginal dryness no    LMP \par \par Hx of hysterectomy in  for heavy bleeding\par

## 2022-02-08 NOTE — ASSESSMENT
[FreeTextEntry1] : Overactive bladder syndrome -\par Patient has not been able to use vaginal estrogen cream but has been trying to follow the bladder diet. Her symptoms are largely unchanged from before. We discussed second line treatment options today. R/B/A of anticholinergic use as well as b-3 agonist use were discussed with the patient. She would like a trial of Myrbetriq. Rx for Myrbetriq 25mg daily was sent to her pharmacy with a 90 day supply. Patient was instructed to measure her BP about 2 weeks after starting the medication. She will return in around 6 weeks for follow up.\par \par Vaginal atrophy -\par Patient unable to use vaginal estrogen cream. May be a candidate for estring down the line.\par \par

## 2022-02-08 NOTE — COUNSELING
[FreeTextEntry1] : Please return in about 6 weeks.\par \par Please start Myrbetriq 25mg daily and make sure to take your blood pressure 2 weeks after starting it. If your blood pressure is high, then please call the office right away and stop taking the medication.\par \par For Urgency, Frequency and Urge related incontinence.\par \par Please decrease or stop the use of the following:\par \par 1. Coffee (Caffeinated and decaffeinated)\par \par 2. Teas (Caffeinated, decaffeinated, Ice tea, and green teas\par \par 3. All sodas (Caffeinated, decaffeinated, energy drinks)\par \par 4. All carbonated drinks including seltzer water\par \par 5. All citric fruit juices\par \par 6. Water with lemon or lime\par \par 7. Spicy foods\par \par 8. Tomato Sauce based foods\par \par 9. Chocolate and chocolate containing products\par \par 10. All alcohol\par

## 2022-06-08 ENCOUNTER — APPOINTMENT (OUTPATIENT)
Dept: CARDIOLOGY | Facility: CLINIC | Age: 82
End: 2022-06-08
Payer: MEDICARE

## 2022-06-08 VITALS
HEIGHT: 66 IN | DIASTOLIC BLOOD PRESSURE: 80 MMHG | SYSTOLIC BLOOD PRESSURE: 138 MMHG | WEIGHT: 210 LBS | BODY MASS INDEX: 33.75 KG/M2

## 2022-06-08 PROCEDURE — 93000 ELECTROCARDIOGRAM COMPLETE: CPT

## 2022-06-08 PROCEDURE — 99213 OFFICE O/P EST LOW 20 MIN: CPT

## 2022-06-08 RX ORDER — ESTRADIOL 0.1 MG/G
0.1 CREAM VAGINAL
Qty: 1 | Refills: 6 | Status: DISCONTINUED | COMMUNITY
Start: 2021-11-02 | End: 2022-06-08

## 2022-06-08 RX ORDER — GABAPENTIN 300 MG
300 TABLET ORAL
Refills: 0 | Status: DISCONTINUED | COMMUNITY
End: 2022-06-08

## 2022-06-08 RX ORDER — NITROFURANTOIN (MONOHYDRATE/MACROCRYSTALS) 25; 75 MG/1; MG/1
100 CAPSULE ORAL
Qty: 10 | Refills: 0 | Status: DISCONTINUED | COMMUNITY
Start: 2021-11-08 | End: 2022-06-08

## 2022-06-08 RX ORDER — MIRABEGRON 25 MG/1
25 TABLET, FILM COATED, EXTENDED RELEASE ORAL
Qty: 90 | Refills: 0 | Status: DISCONTINUED | COMMUNITY
Start: 2022-02-08 | End: 2022-06-08

## 2022-06-16 NOTE — PRE-ANESTHESIA EVALUATION ADULT - NSANTHASARD_GEN_ALL_CORE
· Hold home Klonopin and Trazodone  · Seen by Neuropsych, deemed patient to not have capacity to make decisions  · Delirium precautions   · CAM-ICU    3

## 2022-07-13 ENCOUNTER — EMERGENCY (EMERGENCY)
Facility: HOSPITAL | Age: 82
LOS: 0 days | Discharge: HOME | End: 2022-07-13
Attending: STUDENT IN AN ORGANIZED HEALTH CARE EDUCATION/TRAINING PROGRAM | Admitting: STUDENT IN AN ORGANIZED HEALTH CARE EDUCATION/TRAINING PROGRAM

## 2022-07-13 VITALS
HEIGHT: 66 IN | SYSTOLIC BLOOD PRESSURE: 108 MMHG | RESPIRATION RATE: 18 BRPM | HEART RATE: 78 BPM | DIASTOLIC BLOOD PRESSURE: 56 MMHG | OXYGEN SATURATION: 95 % | TEMPERATURE: 98 F

## 2022-07-13 DIAGNOSIS — Z91.048 OTHER NONMEDICINAL SUBSTANCE ALLERGY STATUS: ICD-10-CM

## 2022-07-13 DIAGNOSIS — M25.561 PAIN IN RIGHT KNEE: ICD-10-CM

## 2022-07-13 DIAGNOSIS — S00.03XA CONTUSION OF SCALP, INITIAL ENCOUNTER: ICD-10-CM

## 2022-07-13 DIAGNOSIS — Z90.710 ACQUIRED ABSENCE OF BOTH CERVIX AND UTERUS: Chronic | ICD-10-CM
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
## 2022-07-13 DIAGNOSIS — Z91.040 LATEX ALLERGY STATUS: ICD-10-CM
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
## 2022-07-13 DIAGNOSIS — M79.602 PAIN IN LEFT ARM: ICD-10-CM

## 2022-07-13 DIAGNOSIS — M25.512 PAIN IN LEFT SHOULDER: ICD-10-CM

## 2022-07-13 DIAGNOSIS — Z88.2 ALLERGY STATUS TO SULFONAMIDES: ICD-10-CM

## 2022-07-13 DIAGNOSIS — I48.91 UNSPECIFIED ATRIAL FIBRILLATION: ICD-10-CM

## 2022-07-13 DIAGNOSIS — M54.50 LOW BACK PAIN, UNSPECIFIED: ICD-10-CM

## 2022-07-13 DIAGNOSIS — Z79.82 LONG TERM (CURRENT) USE OF ASPIRIN: ICD-10-CM

## 2022-07-13 DIAGNOSIS — Z98.890 OTHER SPECIFIED POSTPROCEDURAL STATES: Chronic | ICD-10-CM

## 2022-07-13 DIAGNOSIS — G89.29 OTHER CHRONIC PAIN: ICD-10-CM

## 2022-07-13 DIAGNOSIS — M54.6 PAIN IN THORACIC SPINE: ICD-10-CM

## 2022-07-13 DIAGNOSIS — Z90.89 ACQUIRED ABSENCE OF OTHER ORGANS: Chronic | ICD-10-CM

## 2022-07-13 DIAGNOSIS — Z90.49 ACQUIRED ABSENCE OF OTHER SPECIFIED PARTS OF DIGESTIVE TRACT: Chronic | ICD-10-CM

## 2022-07-13 DIAGNOSIS — Z87.828 PERSONAL HISTORY OF OTHER (HEALED) PHYSICAL INJURY AND TRAUMA: ICD-10-CM

## 2022-07-13 LAB
ALBUMIN SERPL ELPH-MCNC: 4.4 G/DL — SIGNIFICANT CHANGE UP (ref 3.5–5.2)
ALP SERPL-CCNC: 43 U/L — SIGNIFICANT CHANGE UP (ref 30–115)
ALT FLD-CCNC: 18 U/L — SIGNIFICANT CHANGE UP (ref 0–41)
ANION GAP SERPL CALC-SCNC: 9 MMOL/L — SIGNIFICANT CHANGE UP (ref 7–14)
APTT BLD: 27.1 SEC — SIGNIFICANT CHANGE UP (ref 27–39.2)
AST SERPL-CCNC: 37 U/L — SIGNIFICANT CHANGE UP (ref 0–41)
BASOPHILS # BLD AUTO: 0.07 K/UL — SIGNIFICANT CHANGE UP (ref 0–0.2)
BASOPHILS NFR BLD AUTO: 0.4 % — SIGNIFICANT CHANGE UP (ref 0–1)
BILIRUB SERPL-MCNC: 0.5 MG/DL — SIGNIFICANT CHANGE UP (ref 0.2–1.2)
BLD GP AB SCN SERPL QL: SIGNIFICANT CHANGE UP
BUN SERPL-MCNC: 19 MG/DL — SIGNIFICANT CHANGE UP (ref 10–20)
CALCIUM SERPL-MCNC: 9.5 MG/DL — SIGNIFICANT CHANGE UP (ref 8.5–10.1)
CHLORIDE SERPL-SCNC: 101 MMOL/L — SIGNIFICANT CHANGE UP (ref 98–110)
CO2 SERPL-SCNC: 25 MMOL/L — SIGNIFICANT CHANGE UP (ref 17–32)
CREAT SERPL-MCNC: 0.8 MG/DL — SIGNIFICANT CHANGE UP (ref 0.7–1.5)
EGFR: 74 ML/MIN/1.73M2 — SIGNIFICANT CHANGE UP
EOSINOPHIL # BLD AUTO: 0.04 K/UL — SIGNIFICANT CHANGE UP (ref 0–0.7)
EOSINOPHIL NFR BLD AUTO: 0.2 % — SIGNIFICANT CHANGE UP (ref 0–8)
GLUCOSE SERPL-MCNC: 112 MG/DL — HIGH (ref 70–99)
HCT VFR BLD CALC: 41.3 % — SIGNIFICANT CHANGE UP (ref 37–47)
HGB BLD-MCNC: 13.9 G/DL — SIGNIFICANT CHANGE UP (ref 12–16)
IMM GRANULOCYTES NFR BLD AUTO: 0.4 % — HIGH (ref 0.1–0.3)
INR BLD: 1.02 RATIO — SIGNIFICANT CHANGE UP (ref 0.65–1.3)
LIDOCAIN IGE QN: 23 U/L — SIGNIFICANT CHANGE UP (ref 7–60)
LYMPHOCYTES # BLD AUTO: 11.7 % — LOW (ref 20.5–51.1)
LYMPHOCYTES # BLD AUTO: 2.14 K/UL — SIGNIFICANT CHANGE UP (ref 1.2–3.4)
MCHC RBC-ENTMCNC: 29.8 PG — SIGNIFICANT CHANGE UP (ref 27–31)
MCHC RBC-ENTMCNC: 33.7 G/DL — SIGNIFICANT CHANGE UP (ref 32–37)
MCV RBC AUTO: 88.6 FL — SIGNIFICANT CHANGE UP (ref 81–99)
MONOCYTES # BLD AUTO: 1.26 K/UL — HIGH (ref 0.1–0.6)
MONOCYTES NFR BLD AUTO: 6.9 % — SIGNIFICANT CHANGE UP (ref 1.7–9.3)
NEUTROPHILS # BLD AUTO: 14.72 K/UL — HIGH (ref 1.4–6.5)
NEUTROPHILS NFR BLD AUTO: 80.4 % — HIGH (ref 42.2–75.2)
NRBC # BLD: 0 /100 WBCS — SIGNIFICANT CHANGE UP (ref 0–0)
PLATELET # BLD AUTO: 215 K/UL — SIGNIFICANT CHANGE UP (ref 130–400)
POTASSIUM SERPL-MCNC: 4.6 MMOL/L — SIGNIFICANT CHANGE UP (ref 3.5–5)
POTASSIUM SERPL-SCNC: 4.6 MMOL/L — SIGNIFICANT CHANGE UP (ref 3.5–5)
PROT SERPL-MCNC: 6.5 G/DL — SIGNIFICANT CHANGE UP (ref 6–8)
PROTHROM AB SERPL-ACNC: 11.7 SEC — SIGNIFICANT CHANGE UP (ref 9.95–12.87)
RBC # BLD: 4.66 M/UL — SIGNIFICANT CHANGE UP (ref 4.2–5.4)
RBC # FLD: 13.7 % — SIGNIFICANT CHANGE UP (ref 11.5–14.5)
SODIUM SERPL-SCNC: 135 MMOL/L — SIGNIFICANT CHANGE UP (ref 135–146)
WBC # BLD: 18.31 K/UL — HIGH (ref 4.8–10.8)
WBC # FLD AUTO: 18.31 K/UL — HIGH (ref 4.8–10.8)

## 2022-07-13 PROCEDURE — 72170 X-RAY EXAM OF PELVIS: CPT | Mod: 26

## 2022-07-13 PROCEDURE — 70450 CT HEAD/BRAIN W/O DYE: CPT | Mod: 26,MA

## 2022-07-13 PROCEDURE — 73060 X-RAY EXAM OF HUMERUS: CPT | Mod: 26,LT

## 2022-07-13 PROCEDURE — 72125 CT NECK SPINE W/O DYE: CPT | Mod: 26,MA

## 2022-07-13 PROCEDURE — 99285 EMERGENCY DEPT VISIT HI MDM: CPT

## 2022-07-13 PROCEDURE — 73030 X-RAY EXAM OF SHOULDER: CPT | Mod: 26,LT

## 2022-07-13 PROCEDURE — 73080 X-RAY EXAM OF ELBOW: CPT | Mod: 26,LT

## 2022-07-13 PROCEDURE — 71045 X-RAY EXAM CHEST 1 VIEW: CPT | Mod: 26

## 2022-07-13 PROCEDURE — 71260 CT THORAX DX C+: CPT | Mod: 26,MA

## 2022-07-13 PROCEDURE — 74177 CT ABD & PELVIS W/CONTRAST: CPT | Mod: 26,MA

## 2022-07-13 RX ORDER — ACETAMINOPHEN 500 MG
650 TABLET ORAL ONCE
Refills: 0 | Status: DISCONTINUED | OUTPATIENT
Start: 2022-07-13 | End: 2022-07-13

## 2022-07-13 NOTE — ED PROVIDER NOTE - WET READ LAUNCH FT
There are no Wet Read(s) to document. There are 5 Wet Read(s) to document. There is 1 Wet Read(s) to document.

## 2022-07-13 NOTE — ED PROVIDER NOTE - NS ED ROS FT
Constitutional: See HPI.  Eyes: No visual changes, eye pain or discharge. No Photophobia  ENMT: No neck pain or stiffness. No limited ROM  Cardiac: No SOB or edema. No chest pain with exertion.  Respiratory: No cough or respiratory distress. N  GI: No nausea, vomiting, diarrhea or abdominal pain.  : No dysuria, frequency or burning. No Discharge  MS: see hpi .  Neuro: see hpi .  Skin: No skin rash.  Except as documented in the HPI, all other systems are negative.

## 2022-07-13 NOTE — ED PROVIDER NOTE - PROGRESS NOTE DETAILS
FF: discussed radiology and lab results with pt and pt daughter. pt advised to have cbc repeated due to elevated wbc count. pt advised of return precautions discussed at bedside. agreeable to dc. f/u with pcp.

## 2022-07-13 NOTE — ED PROVIDER NOTE - OBJECTIVE STATEMENT
82-year-old female with history of atrial fibrillation on enalapril and baby aspirin with chronic right knee pain due to meniscus injury presented to the ED status post mechanical fall missed last step of staircase at the bottom and had 3 and steps to the right of staircase fell to the left hip left shoulder against wall and proceeded to fall down to the ground hitting the left side of her head and lower back.  Patient complains of left temple pain left shoulder pain left arm pain thoracic and lumbar pain status post fall.  Denies any anticoagulation.  Denies any chest pain shortness of breath abdominal pain no nausea vomiting diarrhea. 82-year-old female with history of atrial fibrillation on enalapril and baby aspirin with chronic right knee pain due to meniscus injury presented to the ED status post mechanical fall missed last step of staircase at the bottom and had 3 and steps to the right of staircase fell to the left hip left shoulder against wall and proceeded to fall down to the ground hitting the left side of her head and lower back.  Patient complains of left temple pain left shoulder pain left arm pain thoracic and lumbar pain status post fall.  Denies any anticoagulation.  Denies any loc, chest pain shortness of breath abdominal pain no nausea vomiting diarrhea. 82-year-old female with history of atrial fibrillation on enalapril and baby aspirin with chronic right knee pain due to meniscus injury presented to the ED status post mechanical fall missed last step of staircase at the bottom and had 3 and steps to the right of staircase fell to the left hip left shoulder against wall and proceeded to fall down to the ground hitting the left side of her head and lower back.  Patient complains of left temple pain left shoulder pain left arm pain thoracic and lumbar pain status post fall.  pt was able to get up on her own and walk around with her walker. Denies any anticoagulation.  Denies any loc, chest pain shortness of breath abdominal pain no nausea vomiting diarrhea.

## 2022-07-13 NOTE — ED PROVIDER NOTE - PATIENT PORTAL LINK FT
You can access the FollowMyHealth Patient Portal offered by [facility] by registering at the following website: [email or web address]. By joining [employer]’s FollowMyHealth portal, you will also be able to view your health information using other applications (apps) compatible with our system.

## 2022-07-13 NOTE — ED PROVIDER NOTE - NS ED ATTENDING STATEMENT MOD
This was a shared visit with the DEISY. I reviewed and verified the documentation and independently performed the documented:

## 2022-07-13 NOTE — ED PROVIDER NOTE - CLINICAL SUMMARY MEDICAL DECISION MAKING FREE TEXT BOX
Patient with mechanical slip and fall.  Plan for pan scan and extremity x-rays follow-up imaging and labs and reassess

## 2022-07-13 NOTE — ED PROVIDER NOTE - PHYSICAL EXAMINATION
VITAL SIGNS: I have reviewed nursing notes and confirm.  CONSTITUTIONAL: well-appearing, non-toxic, NAD  SKIN: Warm dry, normal skin turgor  HEAD: NC Contusion to left scalp  EYES: EOMI, PERRLA, no scleral icterus  ENT: Moist mucous membranes, normal pharynx with no erythema or exudates  NECK: Supple; non tender. Full ROM. No cervical LAD  CARD: RRR, no murmurs, rubs or gallops  RESP: clear to ausculation b/l.  No rales, rhonchi, or wheezing.  ABD: soft, + BS, non-tender, non-distended, no rebound or guarding. No CVA tenderness  MSK: Full ROM, Left AC tenderness left humerus tenderness.  No obvious deformity Thoracic and lumbar tenderness at T8 L1.  NEURO: normal motor. normal sensory. CN II-XII intact. Cerebellar testing normal.   PSYCH: Cooperative, appropriate. VITAL SIGNS: I have reviewed nursing notes and confirm.  CONSTITUTIONAL: well-appearing, non-toxic, NAD  SKIN: Warm dry, normal skin turgor.(+) erythema to the left lower lumbar paraspinal region   HEAD: NC Contusion to left scalp  EYES: EOMI, PERRLA, no scleral icterus  ENT: Moist mucous membranes, normal pharynx with no erythema or exudates  NECK: Supple; non tender. Full ROM. No cervical LAD  CARD: RRR, no murmurs, rubs or gallops  RESP: clear to ausculation b/l.  No rales, rhonchi, or wheezing.  ABD: soft, + BS, non-tender, non-distended, no rebound or guarding. No CVA tenderness  MSK: Full ROM, Left AC tenderness left humerus tenderness.  No obvious deformity Thoracic and lumbar tenderness at T8 L1.  NEURO: (+) left occipital scalp hematoma normal motor. normal sensory. CN II-XII intact. Cerebellar testing normal.   PSYCH: Cooperative, appropriate.

## 2022-07-13 NOTE — ED ADULT NURSE NOTE - CHIEF COMPLAINT QUOTE
LOV 2/10/20    LAST LAB 1/9/20    LAST RX 1/17/20 30 tabs 0 refills    Next OV   Future Appointments   Date Time Provider Meredith Jeter   3/18/2020  4:00 PM [facility] NURSE [facility] [place]   4/38/0953 85:59 AM Guillermina Ureña MD [facility] Jennifer Lomax tripped and fell this am, missed a step. c/o left shoulder and left head pain. no LOC, not on blood thinners.

## 2022-07-13 NOTE — ED ADULT TRIAGE NOTE - CHIEF COMPLAINT QUOTE
tripped and fell this am, missed a step. c/o left shoulder and left head pain. no LOC, not on blood thinners.

## 2022-07-13 NOTE — ED ADULT NURSE NOTE - OBJECTIVE STATEMENT
Trip and fall this morning, pt at baseline ambulatory status after. Pt c/o head pain, left shoulder pain and left side pain.

## 2022-09-15 ENCOUNTER — APPOINTMENT (OUTPATIENT)
Dept: ORTHOPEDIC SURGERY | Facility: CLINIC | Age: 82
End: 2022-09-15

## 2022-09-15 PROCEDURE — 99213 OFFICE O/P EST LOW 20 MIN: CPT

## 2022-09-15 NOTE — HISTORY OF PRESENT ILLNESS
[de-identified] : History of Present Illness\par 82-year-old female comes in today for follow-up visit she status post right knee arthroscopy performed with a partial\par medial and lateral meniscectomy on June 18, 2021 . She currently ambulates with a cane, she is using naproxen. doing\par physical therapy still reporting some swelling does feel significant improvement sleeping better use a cane does feel\par better occasion some tightness not traveling to [place] weight few lb

## 2022-09-15 NOTE — REASON FOR VISIT
[FreeTextEntry2] : follow up for right knee pain is a little better \par  did have a fall 713 on her left shoulder still struggling with her neuropathy.  The gabapentin was not helping working on her weight still using a cane occasionally taking Naprosyn

## 2022-09-15 NOTE — ASSESSMENT
[FreeTextEntry1] :  we deferred on another cortisone injection for the knee continue therapy and Naprosyn the cane is advise will get her an appointment to see neurology Dr. Hogan she does have follow-up with pain management I will see her in a few months work with weight reduction

## 2022-10-20 ENCOUNTER — OUTPATIENT (OUTPATIENT)
Dept: OUTPATIENT SERVICES | Facility: HOSPITAL | Age: 82
LOS: 1 days | Discharge: HOME | End: 2022-10-20

## 2022-10-20 DIAGNOSIS — Z98.890 OTHER SPECIFIED POSTPROCEDURAL STATES: Chronic | ICD-10-CM

## 2022-10-20 DIAGNOSIS — Z12.31 ENCOUNTER FOR SCREENING MAMMOGRAM FOR MALIGNANT NEOPLASM OF BREAST: ICD-10-CM

## 2022-10-20 DIAGNOSIS — Z90.89 ACQUIRED ABSENCE OF OTHER ORGANS: Chronic | ICD-10-CM

## 2022-10-20 DIAGNOSIS — Z90.710 ACQUIRED ABSENCE OF BOTH CERVIX AND UTERUS: Chronic | ICD-10-CM

## 2022-10-20 DIAGNOSIS — Z90.49 ACQUIRED ABSENCE OF OTHER SPECIFIED PARTS OF DIGESTIVE TRACT: Chronic | ICD-10-CM

## 2022-10-20 PROCEDURE — 77067 SCR MAMMO BI INCL CAD: CPT | Mod: 26

## 2022-10-20 PROCEDURE — 77063 BREAST TOMOSYNTHESIS BI: CPT | Mod: 26

## 2022-10-26 ENCOUNTER — APPOINTMENT (OUTPATIENT)
Dept: NEUROLOGY | Facility: CLINIC | Age: 82
End: 2022-10-26

## 2022-10-26 VITALS
WEIGHT: 210 LBS | BODY MASS INDEX: 33.75 KG/M2 | SYSTOLIC BLOOD PRESSURE: 175 MMHG | DIASTOLIC BLOOD PRESSURE: 105 MMHG | HEIGHT: 66 IN | HEART RATE: 74 BPM

## 2022-10-26 DIAGNOSIS — R26.81 UNSTEADINESS ON FEET: ICD-10-CM

## 2022-10-26 PROCEDURE — 99213 OFFICE O/P EST LOW 20 MIN: CPT

## 2022-10-26 NOTE — ASSESSMENT
[FreeTextEntry1] : polyneuropathy-continue with fall prevention and gait training, followup with pain management to consider electrical stimulation for neuropathic pain.

## 2022-10-26 NOTE — HISTORY OF PRESENT ILLNESS
[FreeTextEntry1] : Ms. BJ FINN returns to the office for follow-up and her prior history and physical have been reviewed and she reports no change since last visit.  she was last seen for neuropathy and lumbar radiculopathy.  she had severe peripheral neuropathy on EMG which most likely was attributed to polymyalgia that she was recently offically diagnosed.  she had a recent fall in the summer coming down stairs when she was distracted.  she has a cane for short distance walking and a walker for longer distance, and she has not fallen when using either a cane or walker.  she is currently doing physical therapy for fall prevention and gait training which she likes.  neuropathic pain can be annoying at times, and she did not respond to gabapentin.  She is currently seeing pain management.

## 2022-10-26 NOTE — HISTORY OF PRESENT ILLNESS
[FreeTextEntry1] : Ms. BJ FINN returns to the office for follow-up and her prior history and physical have been reviewed and she reports no change since last visit.  she was last seen for neuropathy and lumbar radiculopathy.  she had severe peripheral neuropathy on EMG which most likely was attributed to polymyalgia that she was recently offically diagnosed.  she had a recent fall in the summer coming down stairs when she was distracted.  she has a cane for short distance walking and a walker for longer distance, and she has not fallen when using either a cane or walker.  she is currently doing physical therapy for fall prevention and gait training which she likes.  neuropathic pain can be annoying at times, and she did not respond to gabapentin.  She is currently seeing pain management. 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 \par \par Pt. was last seen in May, 2019. During that visit, he was noted to have stable renal function and was advised to lower dose of hydralazine due to low BP. Pt. was supposed to RTC for follow up in 1 month, however was lost to follow up. \par \par Currently, pt. states that he is worried about his A1c getting worse, and has surgery for carpal tunnel syndrome soon. He denies use of NSAIDs or herbal supplements.

## 2022-11-15 ENCOUNTER — APPOINTMENT (OUTPATIENT)
Dept: PAIN MANAGEMENT | Facility: CLINIC | Age: 82
End: 2022-11-15

## 2022-11-15 VITALS
SYSTOLIC BLOOD PRESSURE: 153 MMHG | HEART RATE: 91 BPM | DIASTOLIC BLOOD PRESSURE: 89 MMHG | BODY MASS INDEX: 33.75 KG/M2 | WEIGHT: 210 LBS | HEIGHT: 66 IN

## 2022-11-15 PROCEDURE — 99214 OFFICE O/P EST MOD 30 MIN: CPT

## 2022-11-15 NOTE — PHYSICAL EXAM
[Normal Coordination] : normal coordination [Normal DTR UE/LE] : normal DTR UE/LE  [Normal Sensation] : normal sensation [Normal Mood and Affect] : normal mood and affect [Orientated] : orientated [Able to Communicate] : able to communicate [Well Developed] : well developed [Well Nourished] : well nourished [de-identified] : Neck: \par \par Inspection of the cervical spine is as follows: no scars, no erythema, no ecchymosis, no masses and no rashes. Palpation of the cervical spine is as follows: no trapezial spasm, no trapezial tenderness, no rhomboid spasm, no rhomboid tenderness, no paracervical spasm and no paracervical tenderness. Range of motion of the cervical spine is as follows: full range of motion with mild stiffness Strength Testing for the cervical spine is as follows: Left Deltoid strength 5/5, Right Deltoid strength 5/5, Left Biceps 5/5, Right Biceps 5/5, Left Triceps 5/5, Right Triceps 5/5, Left Wrist Flexors 5/5, Right Wrist Flexors 5/5, Left Finger Abductors 5/5, Right Finger Abductors 5/5, Left Grasp 5/5 and Right Grasp 5/5 Neurological testing for the cervical spine is as follows: light touch is intact throughout both upper extremities, normal deep tendon reflexes bilateral upper extremities, negative Spurling test, negative facet loading and negative Teresa reflex Deep tendon reflexes LEFT: Biceps 2+, Triceps 2+ and Brachioradialis 2+. Deep tendon reflexes RIGHT: Biceps 2+, Triceps 2+ and Brachioradialis 2+. Sensation of the RIGHT upper extremity is intact. Sensation of the LEFT upper extremity is intact. \par \par \par Back, including spine: \par \par Inspection of the thoracic/lumbar spine is as follows: No atrophy, erythema, ecchymosis, masses, skin discoloration, swelling and rashes. Palpation of the thoracic/lumbar spine is as follows: right lumbar paraspinal spasm and left lumbar paraspinal tenderness. Range of motion of the thoracic and lumbar spine is as follows: pain at extremes extension and stiffness at extremes extentions. Strength Testing of the thoracic and lumbar spine is as follows: motor exam is 5/5 throughout both lower extremities with normal tone and motor exam is non-focal throughout both lower extremities Special testing of the thoracic and lumbar spine is as follows: Positive sitting straight leg raise on the right. Neurological testing of the thoracic and lumbar spine is as follows: light touch is intact throughout both lower extremities, achillies and patella reflexes are symetrical, no sustained clonus at ankles, negative Babiniski test bilaterally and sensory exam is non-focal throughout both lower extremities Gait and function is as follows: antalgic gait and ambulation with cane.  \par

## 2022-11-15 NOTE — ASSESSMENT
[FreeTextEntry1] : This is a 82 year old female with a chief complaint of severe lower back pain radiating to the right lower extremity to the toes. She is previously underwent injections with significant relief. The left sided pain is more bothersome at this time therefore we will submit for a repeat L L5-S1 SNRI x1 w/ mac. Patient will follow up afterwards.\par \par All this patients questions were answered and the conversation was understood well.\par \par Patient had a MRI that shows a radicular component along with pain referred into the lower extremities. Patient has trialed rehab (home exercise, physical therapy or chiropractic care) and medications. I will schedule a left L5-S1 SNRI.\par \par ANESTHESIA PARAGRAPH: The patient has severe anxiety of procedures that necessitates monitored anesthesia care (MAC). The procedure performed will be close to major nerves, arteries, and spinal cord and/or joint structures. Due to the proximity of these structures, we need the patient to be still during the procedure. With the help of MAC, this will be safely achieved and decrease the risk of any complications.\par \par RISK AND BENEFIT PARAGRAPH: Risk, benefits, pros and cons of procedure were explained to the patient using models and diagrams and their questions were answered.\par \par \par I, Marnie Bauman, attest that this documentation has been prepared under the direction and in the presence of Provider Taryn Kang MD.\par \par \par Thank you for allowing me to assist in the management of this patient. \par \par \par Best Regards, \par \par \par Taryn Kang M.D., [facility]\par \par \par Diplomate, American Board of Physical Medicine and Rehabilitation\par Diplomate, American Board of Pain Medicine \par Diplomate, American Board of Pain Management\par

## 2022-11-15 NOTE — ASSESSMENT
[FreeTextEntry1] : This is a 82 year old female with a chief complaint of severe lower back pain radiating to the right lower extremity to the toes. She is previously underwent injections with significant relief. The left sided pain is more bothersome at this time therefore we will submit for a repeat L L5-S1 SNRI x1 w/ mac. Patient will follow up afterwards.\par \par All this patients questions were answered and the conversation was understood well.\par \par Patient had a MRI that shows a radicular component along with pain referred into the lower extremities. Patient has trialed rehab (home exercise, physical therapy or chiropractic care) and medications. I will schedule a left L5-S1 SNRI.\par \par ANESTHESIA PARAGRAPH: The patient has severe anxiety of procedures that necessitates monitored anesthesia care (MAC). The procedure performed will be close to major nerves, arteries, and spinal cord and/or joint structures. Due to the proximity of these structures, we need the patient to be still during the procedure. With the help of MAC, this will be safely achieved and decrease the risk of any complications.\par \par RISK AND BENEFIT PARAGRAPH: Risk, benefits, pros and cons of procedure were explained to the patient using models and diagrams and their questions were answered.\par \par \par I, Marnie Bauman, attest that this documentation has been prepared under the direction and in the presence of Provider Taryn Kang MD.\par \par \par Thank you for allowing me to assist in the management of this patient. \par \par \par Best Regards, \par \par \par Taryn Kang M.D., [facility]\par \par \par Diplomate, American Board of Physical Medicine and Rehabilitation\par Diplomate, American Board of Pain Medicine \par Diplomate, American Board of Pain Management\par

## 2022-11-15 NOTE — PHYSICAL EXAM
[Normal Coordination] : normal coordination [Normal DTR UE/LE] : normal DTR UE/LE  [Normal Sensation] : normal sensation [Normal Mood and Affect] : normal mood and affect [Orientated] : orientated [Able to Communicate] : able to communicate [Well Developed] : well developed [Well Nourished] : well nourished [de-identified] : Neck: \par \par Inspection of the cervical spine is as follows: no scars, no erythema, no ecchymosis, no masses and no rashes. Palpation of the cervical spine is as follows: no trapezial spasm, no trapezial tenderness, no rhomboid spasm, no rhomboid tenderness, no paracervical spasm and no paracervical tenderness. Range of motion of the cervical spine is as follows: full range of motion with mild stiffness Strength Testing for the cervical spine is as follows: Left Deltoid strength 5/5, Right Deltoid strength 5/5, Left Biceps 5/5, Right Biceps 5/5, Left Triceps 5/5, Right Triceps 5/5, Left Wrist Flexors 5/5, Right Wrist Flexors 5/5, Left Finger Abductors 5/5, Right Finger Abductors 5/5, Left Grasp 5/5 and Right Grasp 5/5 Neurological testing for the cervical spine is as follows: light touch is intact throughout both upper extremities, normal deep tendon reflexes bilateral upper extremities, negative Spurling test, negative facet loading and negative Teresa reflex Deep tendon reflexes LEFT: Biceps 2+, Triceps 2+ and Brachioradialis 2+. Deep tendon reflexes RIGHT: Biceps 2+, Triceps 2+ and Brachioradialis 2+. Sensation of the RIGHT upper extremity is intact. Sensation of the LEFT upper extremity is intact. \par \par \par Back, including spine: \par \par Inspection of the thoracic/lumbar spine is as follows: No atrophy, erythema, ecchymosis, masses, skin discoloration, swelling and rashes. Palpation of the thoracic/lumbar spine is as follows: right lumbar paraspinal spasm and left lumbar paraspinal tenderness. Range of motion of the thoracic and lumbar spine is as follows: pain at extremes extension and stiffness at extremes extentions. Strength Testing of the thoracic and lumbar spine is as follows: motor exam is 5/5 throughout both lower extremities with normal tone and motor exam is non-focal throughout both lower extremities Special testing of the thoracic and lumbar spine is as follows: Positive sitting straight leg raise on the right. Neurological testing of the thoracic and lumbar spine is as follows: light touch is intact throughout both lower extremities, achillies and patella reflexes are symetrical, no sustained clonus at ankles, negative Babiniski test bilaterally and sensory exam is non-focal throughout both lower extremities Gait and function is as follows: antalgic gait and ambulation with cane.  \par

## 2022-11-15 NOTE — DATA REVIEWED
[FreeTextEntry1] : X-rays cervical spine dated 03/01/2022: Impression: Cervical spondylosis with anterior osteophytes noted at C4-5 and C5-6 with a small dorsal osteophyte minimally encroaching the spinal canal C5-6 disc space level.  \par \par MRI lumbar spine dated 02/05/2021 demonstrates minimal lumbar dextroscoliosis. Diffuse degenerative Lower Thoracic, lumbar disc change/disc protrusions. Mid, lower lumbar degenerative facet disease. Left posterolateral L2-3 disc protrusion which abuts the left L3 nerve root. Minimal L3-4 thecal sac narrowing. Bilateral L5-S1 foraminal stenosis which at least abuts the L5 nerve root. Question of left L5 nerve root compression. Bilateral probable renal cysts. 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 In the presence of peripheral neuropathy radiculopathy cannot be ruled out.\par  \par \par \par SOAPP: low on 3/01/22\par Low risk: Patient has combination of a low risk SOAP and no risk factors. UDS would be repeated randomly every quarter.\par \par UDS: No data obtained today\par \par [place] REGISTRY: Checked\par

## 2022-11-15 NOTE — HISTORY OF PRESENT ILLNESS
[FreeTextEntry1] : ORIGINAL PRESENTATION: Mrs. Abdalla is an 82 year-old female referred by Dr. Hogan complaining of back pain. She\par presents with a chief complaint of severe lower back pain radiating to the right lower extremity to the toes which began 12/2020. She states this pain is constant, severe, 10/10 on the pain scale. She states the pain is especially worse at night making sleeping and remaining asleep difficult. She has been taking Gabapentin 300mg t.i.d. with nor relief. She states the only thing that provided relief was over the counter Salon Pas patches. Of note, she has not trialed any conservative measures for this issue.  The pain started after no known injury. The patient has had this pain for 2 months. Patient describes pain as severe, 10/10 on the pain scale. During the last month the pain has been constant with symptoms worsening at night. Pain described as burning, sharp, pressure-like, throbbing. Pain is associated with numbness/pins and needles into the feet, leg. Patient has weakness in the lower extremities. Pain is increased with lying down, standing, sitting, walking, exercise, relaxation. Pain is not changed with coughing/sneezing, bowel movements. Bowel or bladder habits , loss of bladder control, frequent urination.\par \par ACTIVITIES: Patient could walk less than a block before the pain starts. The patient often lies down because of pain. Patient uses a cane at this time. Patient has difficulty performing household chores, doing outside work or shopping, exercising at this time.\par \par PRIOR PAIN TREATMENTS: Moderate relief with physical therapy, exercise, heat treatment, cold treatment. \par \par PRIOR PAIN MEDICATIONS: Tylenol, aspirin, Motrin, naproxen, Neurontin. \par \par \par PATIENT PRESENTS FOR FOLLOW UP : She was last seen 5/13/22 and was s/p B L5-S1 SNRI on 4/22/22 with 100% relief. Since then she has followed up with Dr. Mcmillan and Dr. Hogan. She fell recently fell which exacerbated her pain. She states her pain is worse on the left side and is interested in repeating injections. A Left L5-S1 SNRI x1 w/ MAC was discussed in detail and she would like to move forward at this time.

## 2022-12-06 ENCOUNTER — APPOINTMENT (OUTPATIENT)
Dept: PAIN MANAGEMENT | Facility: CLINIC | Age: 82
End: 2022-12-06

## 2022-12-06 PROCEDURE — 94761 N-INVAS EAR/PLS OXIMETRY MLT: CPT

## 2022-12-06 PROCEDURE — 93770 DETERMINATION VENOUS PRESS: CPT

## 2022-12-06 PROCEDURE — 64483 NJX AA&/STRD TFRM EPI L/S 1: CPT | Mod: LT

## 2022-12-06 PROCEDURE — 00630 ANES PX LUMBAR REGION NOS: CPT | Mod: QZ,P3

## 2022-12-06 NOTE — PROCEDURE
[FreeTextEntry1] : SELECTIVE TRANSFORAMINAL LUMBAR EPIDURAL NERVE ROOT INJECTION UNDER FLUOROSCOPY [FreeTextEntry3] : Date:  2022\par \par Patient: BJ FINN\par \par :  1940\par \par \par \par  \par \par \par \par Preoperative Diagnosis: Left L5 Radiculitis\par \par Procedure: Selective Left L5-S1 Transforaminal Lumbar Epidural Nerve Root Injection under Fluoroscopy\par \par Physician: Taryn Kang MD, FAAPMR\par \par \par \par Anesthesia: See nurses notes, MAC, Versed 2mg, Fentanyl 100 mcg\par \par Anesthesiologist/CRNA: Dr. Ely, Mr. Alvares\par \par \par \par Medical Necessity:  Failure of conservative management.\par \par \par \par Indication for Fluoroscopy:  This procedure requires the precise placement of the spinal needle into the specific paravertebral foramen.  It is the only way to accurately and safely perform the injection.\par \par \par \par CONSENT: The possible complications including infection, bleeding, nerve damage, Hospital admission, death or failure of the procedure; though unusual, are theoretically possible. The patient was educated about the of the procedure and alternative therapies. All questions were answered and the patient freely gave consent to proceed.\par \par \par \par Monitoring: Patient had continuous blood pressure, EKG, and pulse oximetry throughout the case. See nurse's notes\par \par  \par \par PROCEDURE NOTE:\par \par After obtaining written consent, the patient was placed on the fluoroscopic table in the prone position with the pillow placed under the hips. The lumbar area was prepped with betadine solution and draped in the usual manner. A time out was performed.  Cold spray was used to localize the area. The fluoroscope was used to identify the L3///L4///L5 vertebral body on the AP projection. It was then rotated into an oblique projection until the superior articulating process of the S1 (inferior) vertebra is projected beneath the 6 o'clock position of the L5 (superior) vertebrae. The 22 gauge 5inch needle was inserted in the skin at a point overlying the superior articulating process of the inferior vertebra and aimed for the 6 o'clock position of the superior vertebrae's pedicle.  After the needle contacted bone, a lateral projection was obtained to insure that the needle tip was in proximity with the vertebral body. Paresthesias were not noted.  One ml of Omnipaque 240 was injected and a neurogram was obtained. Following demonstration of the neurogram, 1 ml of Preservative free normal saline and 1 ml of depomedrol (80mg) was injected. The small volume and relatively high concentration was chosen to preserve selectivity and diagnostic value of the injection. There was no CSF or heme identified. There were no signs of, intravascular block or hypotension. The needle was removed intact. A band aid was place on the site.\par \par  \par \par Epidurogram: The nerve root was observed in its outline on the neurogram. Distal and proximal spread was noted pointing away from epidural fibrosis/scarring.\par \par  \par \par Complications: None. The patient tolerated the procedure well. \par \par  \par \par Disposition: I have examined the patient and there are no new physical findings since the original presentation.  Sensory and motor function were intact. The patient met discharge criteria see nurses notes. The discharge instruction sheet was reviewed and given to the patient. The patient was discharged home with a . If patient gets sustained relief will have patient do modified planks 3 times a day on carpet or yoga mat starting at 5 seconds building up to 1 minute without grimacing/Valsalva and walking. \par \par  \par \par Comment: 1st SNRI today, depending on effectiveness would schedule 2nd SNRI in 1-2 weeks vs caudal epidural steroid vs follow up in office depending on insurances. Follow up office. Call if any problems\par \par \par \par  \par \par This document was electronically signed by: \par \par Taryn Kang MD, FAAPMR\par Diplomate, American Board of Physical Medicine and Rehabilitation\par Diplomate, American Board of Pain Medicine\par \par

## 2022-12-20 ENCOUNTER — EMERGENCY (EMERGENCY)
Facility: HOSPITAL | Age: 82
LOS: 0 days | Discharge: HOME | End: 2022-12-21
Attending: STUDENT IN AN ORGANIZED HEALTH CARE EDUCATION/TRAINING PROGRAM | Admitting: STUDENT IN AN ORGANIZED HEALTH CARE EDUCATION/TRAINING PROGRAM

## 2022-12-20 ENCOUNTER — APPOINTMENT (OUTPATIENT)
Dept: PAIN MANAGEMENT | Facility: CLINIC | Age: 82
End: 2022-12-20

## 2022-12-20 VITALS
DIASTOLIC BLOOD PRESSURE: 71 MMHG | HEART RATE: 104 BPM | WEIGHT: 210.1 LBS | RESPIRATION RATE: 18 BRPM | OXYGEN SATURATION: 99 % | SYSTOLIC BLOOD PRESSURE: 144 MMHG | TEMPERATURE: 96 F | HEIGHT: 66 IN

## 2022-12-20 VITALS — BODY MASS INDEX: 33.75 KG/M2 | HEIGHT: 66 IN | WEIGHT: 210 LBS

## 2022-12-20 DIAGNOSIS — Z90.710 ACQUIRED ABSENCE OF BOTH CERVIX AND UTERUS: Chronic | ICD-10-CM

## 2022-12-20 DIAGNOSIS — Z90.89 ACQUIRED ABSENCE OF OTHER ORGANS: ICD-10-CM

## 2022-12-20 DIAGNOSIS — R06.02 SHORTNESS OF BREATH: ICD-10-CM

## 2022-12-20 DIAGNOSIS — Z90.710 ACQUIRED ABSENCE OF BOTH CERVIX AND UTERUS: ICD-10-CM

## 2022-12-20 DIAGNOSIS — R00.2 PALPITATIONS: ICD-10-CM

## 2022-12-20 DIAGNOSIS — Z20.822 CONTACT WITH AND (SUSPECTED) EXPOSURE TO COVID-19: ICD-10-CM

## 2022-12-20 DIAGNOSIS — Z90.49 ACQUIRED ABSENCE OF OTHER SPECIFIED PARTS OF DIGESTIVE TRACT: Chronic | ICD-10-CM

## 2022-12-20 DIAGNOSIS — Z88.2 ALLERGY STATUS TO SULFONAMIDES: ICD-10-CM

## 2022-12-20 DIAGNOSIS — I10 ESSENTIAL (PRIMARY) HYPERTENSION: ICD-10-CM

## 2022-12-20 DIAGNOSIS — Z90.89 ACQUIRED ABSENCE OF OTHER ORGANS: Chronic | ICD-10-CM

## 2022-12-20 DIAGNOSIS — I34.0 NONRHEUMATIC MITRAL (VALVE) INSUFFICIENCY: ICD-10-CM

## 2022-12-20 DIAGNOSIS — Z80.1 FAMILY HISTORY OF MALIGNANT NEOPLASM OF TRACHEA, BRONCHUS AND LUNG: ICD-10-CM

## 2022-12-20 DIAGNOSIS — Z98.890 OTHER SPECIFIED POSTPROCEDURAL STATES: Chronic | ICD-10-CM

## 2022-12-20 DIAGNOSIS — M19.90 UNSPECIFIED OSTEOARTHRITIS, UNSPECIFIED SITE: ICD-10-CM

## 2022-12-20 DIAGNOSIS — Z91.040 LATEX ALLERGY STATUS: ICD-10-CM

## 2022-12-20 DIAGNOSIS — I48.91 UNSPECIFIED ATRIAL FIBRILLATION: ICD-10-CM

## 2022-12-20 DIAGNOSIS — Z80.51 FAMILY HISTORY OF MALIGNANT NEOPLASM OF KIDNEY: ICD-10-CM

## 2022-12-20 LAB
ALBUMIN SERPL ELPH-MCNC: 4.5 G/DL — SIGNIFICANT CHANGE UP (ref 3.5–5.2)
ALP SERPL-CCNC: 44 U/L — SIGNIFICANT CHANGE UP (ref 30–115)
ALT FLD-CCNC: 30 U/L — SIGNIFICANT CHANGE UP (ref 0–41)
ANION GAP SERPL CALC-SCNC: 13 MMOL/L — SIGNIFICANT CHANGE UP (ref 7–14)
AST SERPL-CCNC: 33 U/L — SIGNIFICANT CHANGE UP (ref 0–41)
BASOPHILS # BLD AUTO: 0.04 K/UL — SIGNIFICANT CHANGE UP (ref 0–0.2)
BASOPHILS NFR BLD AUTO: 0.4 % — SIGNIFICANT CHANGE UP (ref 0–1)
BILIRUB SERPL-MCNC: 0.5 MG/DL — SIGNIFICANT CHANGE UP (ref 0.2–1.2)
BUN SERPL-MCNC: 20 MG/DL — SIGNIFICANT CHANGE UP (ref 10–20)
CALCIUM SERPL-MCNC: 9.9 MG/DL — SIGNIFICANT CHANGE UP (ref 8.4–10.5)
CHLORIDE SERPL-SCNC: 99 MMOL/L — SIGNIFICANT CHANGE UP (ref 98–110)
CO2 SERPL-SCNC: 26 MMOL/L — SIGNIFICANT CHANGE UP (ref 17–32)
CREAT SERPL-MCNC: 0.9 MG/DL — SIGNIFICANT CHANGE UP (ref 0.7–1.5)
EGFR: 65 ML/MIN/1.73M2 — SIGNIFICANT CHANGE UP
EOSINOPHIL # BLD AUTO: 0.06 K/UL — SIGNIFICANT CHANGE UP (ref 0–0.7)
EOSINOPHIL NFR BLD AUTO: 0.6 % — SIGNIFICANT CHANGE UP (ref 0–8)
GLUCOSE SERPL-MCNC: 102 MG/DL — HIGH (ref 70–99)
HCT VFR BLD CALC: 44.7 % — SIGNIFICANT CHANGE UP (ref 37–47)
HGB BLD-MCNC: 15 G/DL — SIGNIFICANT CHANGE UP (ref 12–16)
IMM GRANULOCYTES NFR BLD AUTO: 0.4 % — HIGH (ref 0.1–0.3)
LYMPHOCYTES # BLD AUTO: 2.19 K/UL — SIGNIFICANT CHANGE UP (ref 1.2–3.4)
LYMPHOCYTES # BLD AUTO: 21.1 % — SIGNIFICANT CHANGE UP (ref 20.5–51.1)
MAGNESIUM SERPL-MCNC: 1.8 MG/DL — SIGNIFICANT CHANGE UP (ref 1.8–2.4)
MCHC RBC-ENTMCNC: 30.5 PG — SIGNIFICANT CHANGE UP (ref 27–31)
MCHC RBC-ENTMCNC: 33.6 G/DL — SIGNIFICANT CHANGE UP (ref 32–37)
MCV RBC AUTO: 91 FL — SIGNIFICANT CHANGE UP (ref 81–99)
MONOCYTES # BLD AUTO: 0.79 K/UL — HIGH (ref 0.1–0.6)
MONOCYTES NFR BLD AUTO: 7.6 % — SIGNIFICANT CHANGE UP (ref 1.7–9.3)
NEUTROPHILS # BLD AUTO: 7.26 K/UL — HIGH (ref 1.4–6.5)
NEUTROPHILS NFR BLD AUTO: 69.9 % — SIGNIFICANT CHANGE UP (ref 42.2–75.2)
NRBC # BLD: 0 /100 WBCS — SIGNIFICANT CHANGE UP (ref 0–0)
NT-PROBNP SERPL-SCNC: 62 PG/ML — SIGNIFICANT CHANGE UP (ref 0–300)
PLATELET # BLD AUTO: 222 K/UL — SIGNIFICANT CHANGE UP (ref 130–400)
POTASSIUM SERPL-MCNC: 4.5 MMOL/L — SIGNIFICANT CHANGE UP (ref 3.5–5)
POTASSIUM SERPL-SCNC: 4.5 MMOL/L — SIGNIFICANT CHANGE UP (ref 3.5–5)
PROT SERPL-MCNC: 6.7 G/DL — SIGNIFICANT CHANGE UP (ref 6–8)
RBC # BLD: 4.91 M/UL — SIGNIFICANT CHANGE UP (ref 4.2–5.4)
RBC # FLD: 14.8 % — HIGH (ref 11.5–14.5)
SARS-COV-2 RNA SPEC QL NAA+PROBE: SIGNIFICANT CHANGE UP
SODIUM SERPL-SCNC: 138 MMOL/L — SIGNIFICANT CHANGE UP (ref 135–146)
TROPONIN T SERPL-MCNC: <0.01 NG/ML — SIGNIFICANT CHANGE UP
WBC # BLD: 10.38 K/UL — SIGNIFICANT CHANGE UP (ref 4.8–10.8)
WBC # FLD AUTO: 10.38 K/UL — SIGNIFICANT CHANGE UP (ref 4.8–10.8)

## 2022-12-20 PROCEDURE — 99220: CPT

## 2022-12-20 PROCEDURE — 71045 X-RAY EXAM CHEST 1 VIEW: CPT | Mod: 26

## 2022-12-20 PROCEDURE — 93010 ELECTROCARDIOGRAM REPORT: CPT | Mod: 76

## 2022-12-20 PROCEDURE — 99213 OFFICE O/P EST LOW 20 MIN: CPT

## 2022-12-20 RX ORDER — SODIUM CHLORIDE 9 MG/ML
1000 INJECTION, SOLUTION INTRAVENOUS ONCE
Refills: 0 | Status: COMPLETED | OUTPATIENT
Start: 2022-12-20 | End: 2022-12-20

## 2022-12-20 RX ORDER — ASPIRIN/CALCIUM CARB/MAGNESIUM 324 MG
81 TABLET ORAL DAILY
Refills: 0 | Status: DISCONTINUED | OUTPATIENT
Start: 2022-12-20 | End: 2022-12-21

## 2022-12-20 RX ADMIN — SODIUM CHLORIDE 1000 MILLILITER(S): 9 INJECTION, SOLUTION INTRAVENOUS at 21:34

## 2022-12-20 NOTE — ED CDU PROVIDER INITIAL DAY NOTE - NSICDXPASTMEDICALHX_GEN_ALL_CORE_FT
PAST MEDICAL HISTORY:  Afib AGE 72 X1 EPISODE    Asymptomatic hypertension     Early stage skin cancer SQAMOUS AND BASAL CELL SKIN CA    Low back pain BULGING DISCS LUMBAR SPINE    Mitral regurgitation     Murmur, heart     OA (osteoarthritis)     Peripheral neuropathy

## 2022-12-20 NOTE — ED CDU PROVIDER INITIAL DAY NOTE - CLINICAL SUMMARY MEDICAL DECISION MAKING FREE TEXT BOX
82F with PMH AF not on AC, MVR, HTN who p/w palpitations and sob that started at 2pm yesterday while walking. Lasted 2hrs. Was supposed to see Dr. vu next June. ED course incl EKG, labs and imaging personally reviewed. pt placed in obs for TTE and cardiology eval.

## 2022-12-20 NOTE — ASSESSMENT
[FreeTextEntry1] : This is a 82 year old female with a chief complaint of severe lower back pain radiating to the right lower extremity to the toes. She is s/p a L L5-S1 SNRI x1 w/ mac on 12/06/22 which provided her with 20% relief for 48 hours. She was recently diagnosed with polymyalgia rheumatica and has been utilizing Prednisone and methotrexate. She feels that the diagnosis of AR could explained the decreased relief received from the injection. The option to repeat the L L5-S1 SNRI x1 w/ mac was presented and she would like to move forward. We will follow up post injection. All this patients questions were answered and the conversation was understood well.\par \par Patient had a MRI that shows a radicular component along with pain referred into the lower extremities. Patient has trialed rehab (home exercise, physical therapy or chiropractic care) and medications. I will schedule a left L5-S1 SNRI.\par \par ANESTHESIA PARAGRAPH: The patient has severe anxiety of procedures that necessitates monitored anesthesia care (MAC). The procedure performed will be close to major nerves, arteries, and spinal cord and/or joint structures. Due to the proximity of these structures, we need the patient to be still during the procedure. With the help of MAC, this will be safely achieved and decrease the risk of any complications.\par \par RISK AND BENEFIT PARAGRAPH: Risk, benefits, pros and cons of procedure were explained to the patient using models and diagrams and their questions were answered.\par \par \par I, Marnie Bauman, attest that this documentation has been prepared under the direction and in the presence of Provider Taryn Kang MD.\par \par \par Thank you for allowing me to assist in the management of this patient. \par \par \par Best Regards, \par \par \par Taryn Kang M.D., [facility]\par \par \par Diplomate, American Board of Physical Medicine and Rehabilitation\par Diplomate, American Board of Pain Medicine \par Diplomate, American Board of Pain Management\par

## 2022-12-20 NOTE — ED PROVIDER NOTE - OBJECTIVE STATEMENT
82-year-old female with past medical history of A. brunilda not on blood thinner, mitral valve regurgitation, and hypertension who presents with palpitation and shortness of breath.  Reports that symptom started around 2:00 PM today while she was walking outside; states that symptom lasted for 2 hours until a friend of hers gave her an aspirin.  Reports that she is supposed to see a new cardiologist Dr. Pearl next year.  Denies fever, chest pain, nausea, vomiting, abdominal pain, urinary symptoms, change in bowel movement.

## 2022-12-20 NOTE — HISTORY OF PRESENT ILLNESS
[FreeTextEntry1] : ORIGINAL PRESENTATION: Mrs. Abdalla is an 82 year-old female referred by Dr. Hogan complaining of back pain. She\par presents with a chief complaint of severe lower back pain radiating to the right lower extremity to the toes which began 12/2020. She states this pain is constant, severe, 10/10 on the pain scale. She states the pain is especially worse at night making sleeping and remaining asleep difficult. She has been taking Gabapentin 300mg t.i.d. with nor relief. She states the only thing that provided relief was over the counter Salon Pas patches. Of note, she has not trialed any conservative measures for this issue.  The pain started after no known injury. The patient has had this pain for 2 months. Patient describes pain as severe, 10/10 on the pain scale. During the last month the pain has been constant with symptoms worsening at night. Pain described as burning, sharp, pressure-like, throbbing. Pain is associated with numbness/pins and needles into the feet, leg. Patient has weakness in the lower extremities. Pain is increased with lying down, standing, sitting, walking, exercise, relaxation. Pain is not changed with coughing/sneezing, bowel movements. Bowel or bladder habits , loss of bladder control, frequent urination.\par \par ACTIVITIES: Patient could walk less than a block before the pain starts. The patient often lies down because of pain. Patient uses a cane at this time. Patient has difficulty performing household chores, doing outside work or shopping, exercising at this time.\par \par PRIOR PAIN TREATMENTS: Moderate relief with physical therapy, exercise, heat treatment, cold treatment. \par \par PRIOR PAIN MEDICATIONS: Tylenol, aspirin, Motrin, naproxen, Neurontin. \par \par \par PATIENT PRESENTS FOR FOLLOW UP : The reason for this revisit is severe lower back pain radiating to the right lower extremity to the toes. Today she is s/p a LT L5-S1 SNRI x1 w/ mac on 12/06/22 which provided her with 20% relief for 48 hours.  She is a bit disappointed because previous injections have really provided her with good relief.  Additional PT was approved by insurance which she states provides her some relief. \par \par She was recently diagnosed as having polymyalgia rheumatica and she has been on a course of prednisone and has recently been started on methotrexate. She wonders if this could be the reason as to why the injection did not provide her sustained relief. The option to undergo a repeat injection was discussed in detail and she is agreeable.

## 2022-12-20 NOTE — ED PROVIDER NOTE - NS ED ROS FT
Constitutional: Negative for fever, chills, and fatigue.  HENT: Negative for headache.  Cardiovascular: + palpitation. Negative for chest pain  Respiratory: + sob. Negative for wheezing, cough and sputum production.  Gastrointestinal: Negative for nausea, vomiting, abdominal pain, constipation, diarrhea, hematochezia, and melena.  Genitourinary: Negative for flank pain, dysuria, frequency, and hematuria.  Neurological: Negative for dizziness, syncope, and loss of consciousness.  Hematological: Does not bruise/bleed easily.

## 2022-12-20 NOTE — DATA REVIEWED
[FreeTextEntry1] : X-rays cervical spine dated 03/01/2022: Impression: Cervical spondylosis with anterior osteophytes noted at C4-5 and C5-6 with a small dorsal osteophyte minimally encroaching the spinal canal C5-6 disc space level.  \par \par MRI lumbar spine dated 02/05/2021 demonstrates minimal lumbar dextroscoliosis. Diffuse degenerative Lower Thoracic, lumbar disc change/disc protrusions. Mid, lower lumbar degenerative facet disease. Left posterolateral L2-3 disc protrusion which abuts the left L3 nerve root. Minimal L3-4 thecal sac narrowing. Bilateral L5-S1 foraminal stenosis which at least abuts the L5 nerve root. Question of left L5 nerve root compression. Bilateral probable renal cysts. 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 In the presence of peripheral neuropathy radiculopathy cannot be ruled out.\par  \par SOAPP: low on 3/01/22\par Low risk: Patient has combination of a low risk SOAP and no risk factors. UDS would be repeated randomly every quarter.\par \par UDS: No data obtained today\par \par [place] REGISTRY: Checked\par

## 2022-12-20 NOTE — ED CDU PROVIDER INITIAL DAY NOTE - NSICDXPASTSURGICALHX_GEN_ALL_CORE_FT
PAST SURGICAL HISTORY:  H/O abdominal hysterectomy     History of cholecystectomy     History of surgery REMOVAL OF RIGHT ANKLE SKIN CANCER    History of tonsillectomy

## 2022-12-20 NOTE — ED PROVIDER NOTE - CLINICAL SUMMARY MEDICAL DECISION MAKING FREE TEXT BOX
Patient with hx of RA on methotrexate, previously on prednisone- stopped 3 days prior, A. fib not on blood thinner, mitral valve regurgitation, and hypertension p/w episodes of palpitations associated with light-headedness and sob x 2 episodes today on exertion. No chest pain. Has not had an ECHO per patient's memory with Dr. Chin. Is scheduled to see Dr. Pearl next year. Denies LE edema or exertional chest pain. EKG NSR. Labs unremarkable. Will place in EDOU for echo and cardiology evaluation. Patient declines eliquis although CHADs Vasc 4.

## 2022-12-20 NOTE — PHYSICAL EXAM
[Normal Coordination] : normal coordination [Normal DTR UE/LE] : normal DTR UE/LE  [Normal Sensation] : normal sensation [Normal Mood and Affect] : normal mood and affect [Orientated] : orientated [Able to Communicate] : able to communicate [Well Developed] : well developed [Well Nourished] : well nourished [de-identified] : Neck: \par \par Inspection of the cervical spine is as follows: no scars, no erythema, no ecchymosis, no masses and no rashes. Palpation of the cervical spine is as follows: no trapezial spasm, no trapezial tenderness, no rhomboid spasm, no rhomboid tenderness, no paracervical spasm and no paracervical tenderness. Range of motion of the cervical spine is as follows: full range of motion with mild stiffness Strength Testing for the cervical spine is as follows: Left Deltoid strength 5/5, Right Deltoid strength 5/5, Left Biceps 5/5, Right Biceps 5/5, Left Triceps 5/5, Right Triceps 5/5, Left Wrist Flexors 5/5, Right Wrist Flexors 5/5, Left Finger Abductors 5/5, Right Finger Abductors 5/5, Left Grasp 5/5 and Right Grasp 5/5 Neurological testing for the cervical spine is as follows: light touch is intact throughout both upper extremities, normal deep tendon reflexes bilateral upper extremities, negative Spurling test, negative facet loading and negative Teresa reflex Deep tendon reflexes LEFT: Biceps 2+, Triceps 2+ and Brachioradialis 2+. Deep tendon reflexes RIGHT: Biceps 2+, Triceps 2+ and Brachioradialis 2+. Sensation of the RIGHT upper extremity is intact. Sensation of the LEFT upper extremity is intact. \par \par \par Back, including spine: \par \par Inspection of the thoracic/lumbar spine is as follows: No atrophy, erythema, ecchymosis, masses, skin discoloration, swelling and rashes. Palpation of the thoracic/lumbar spine is as follows: right lumbar paraspinal spasm and left lumbar paraspinal tenderness. Range of motion of the thoracic and lumbar spine is as follows: pain at extremes extension and stiffness at extremes extentions. Strength Testing of the thoracic and lumbar spine is as follows: motor exam is 5/5 throughout both lower extremities with normal tone and motor exam is non-focal throughout both lower extremities Special testing of the thoracic and lumbar spine is as follows: Positive sitting straight leg raise on the right. Neurological testing of the thoracic and lumbar spine is as follows: light touch is intact throughout both lower extremities, achillies and patella reflexes are symetrical, no sustained clonus at ankles, negative Babiniski test bilaterally and sensory exam is non-focal throughout both lower extremities Gait and function is as follows: antalgic gait and ambulation with cane.  \par

## 2022-12-20 NOTE — ED PROVIDER NOTE - PHYSICAL EXAMINATION
CONSTITUTIONAL: Well-appearing; in no apparent distress.   HEAD: Normocephalic; atraumatic.   EYES: Pupils are round and reactive, extra-ocular muscles are intact. Eyelids are normal in appearance without swelling or lesions.   ENT: Hearing is intact with good acuity to spoken voice.  Patient is speaking clearly, not muffled and airway is intact.   RESPIRATORY: No signs of respiratory distress. Lung sounds are clear in all lobes bilaterally without rales, rhonchi, or wheezes.  CARDIOVASCULAR: Regular rate and rhythm.   GI: Abdomen is soft, non-tender, and without distention. Bowel sounds are present and normoactive in all four quadrants. No masses are noted.   NEURO: A & O x 3. Normal speech. No focal deficit.  PSYCHOLOGICAL: Appropriate mood and affect. Good judgement and insight.

## 2022-12-21 VITALS
TEMPERATURE: 98 F | DIASTOLIC BLOOD PRESSURE: 85 MMHG | HEART RATE: 68 BPM | SYSTOLIC BLOOD PRESSURE: 126 MMHG | RESPIRATION RATE: 18 BRPM | OXYGEN SATURATION: 99 %

## 2022-12-21 LAB — TROPONIN T SERPL-MCNC: <0.01 NG/ML — SIGNIFICANT CHANGE UP

## 2022-12-21 PROCEDURE — 99217: CPT

## 2022-12-21 PROCEDURE — 93306 TTE W/DOPPLER COMPLETE: CPT | Mod: 26

## 2022-12-21 PROCEDURE — 93010 ELECTROCARDIOGRAM REPORT: CPT

## 2022-12-21 RX ADMIN — Medication 81 MILLIGRAM(S): at 11:51

## 2022-12-21 RX ADMIN — Medication 5 MILLIGRAM(S): at 01:54

## 2022-12-21 RX ADMIN — Medication 5 MILLIGRAM(S): at 07:34

## 2022-12-21 NOTE — ED CDU PROVIDER DISPOSITION NOTE - PATIENT PORTAL LINK FT
You can access the FollowMyHealth Patient Portal offered by [facility] by registering at the following website: [email or web address]. By joining [facility]’s FollowMyHealth portal, you will also be able to view your health information using other applications (apps) compatible with our system.

## 2022-12-21 NOTE — ED CDU PROVIDER DISPOSITION NOTE - WET READ LAUNCH FT
Spoke with patient to obtain old records if any. He advised he saw one of the cardiologists at [facility] but never went to the office. Will call to get records if available. There are no Wet Read(s) to document.

## 2022-12-21 NOTE — ED CDU PROVIDER SUBSEQUENT DAY NOTE - PROGRESS NOTE DETAILS
Pt is reassess and sleeping comfortably in bed. Pt with unremarkable echo, reports feeling well. Evaluated at bedside by Dr. Pearl, can dc'ed to f/u outpatient.

## 2022-12-21 NOTE — ED CDU PROVIDER DISPOSITION NOTE - CLINICAL COURSE
82F with PMH AF not on AC, MVR, HTN who p/w palpitations and sob that started at 2pm yesterday while walking. Lasted 2hrs. Was supposed to see Dr. vu next June. ED course incl EKG, labs and imaging personally reviewed. pt placed in obs for TTE which was unremarkable. Per the patient, she was seen by Dr. Vu while in obs at the bedside, was told she can d/c home. I have fully discussed the medical management and delivery of care with the patient. I have discussed any available labs, imaging and treatment options with the patient. All Questions answered at the bedside and printed copies of all results provided and recommended to review with PCP. Patient confirms understanding and has been given detailed return precautions. Patient instructed to return to the ED should symptoms persist or worsen. Patient has demonstrated capacity and has verbalized understanding. Patient is well appearing upon discharge, ambulatory with a steady gait.

## 2022-12-21 NOTE — ED CDU PROVIDER DISPOSITION NOTE - CARE PROVIDER_API CALL
Cesar Pearl)  Cardiovascular Disease; Internal Medicine  [address] [place] [address]  Phone: [phone]  Fax: [phone]  Follow Up Time:

## 2023-01-20 ENCOUNTER — APPOINTMENT (OUTPATIENT)
Dept: PAIN MANAGEMENT | Facility: CLINIC | Age: 83
End: 2023-01-20
Payer: MEDICARE

## 2023-01-20 PROCEDURE — 93770 DETERMINATION VENOUS PRESS: CPT

## 2023-01-20 PROCEDURE — 94761 N-INVAS EAR/PLS OXIMETRY MLT: CPT

## 2023-01-20 PROCEDURE — 00630 ANES PX LUMBAR REGION NOS: CPT | Mod: QZ,P3

## 2023-01-20 PROCEDURE — 64483 NJX AA&/STRD TFRM EPI L/S 1: CPT | Mod: LT

## 2023-01-20 NOTE — PROCEDURE
[FreeTextEntry1] : SELECTIVE TRANSFORAMINAL LUMBAR EPIDURAL NERVE ROOT INJECTION UNDER FLUOROSCOPY [FreeTextEntry3] : Date:  2023\par \par Patient: BJ FINN\par \par :  1940\par \par \par Preoperative Diagnosis: Left L5 Radiculitis\par \par Procedure: Selective Left L5-S1 Transforaminal Lumbar Epidural Nerve Root Injection under Fluoroscopy\par \par Physician: Taryn Kang MD, FAAPMR\par \par \par \par Anesthesia: See nurses notes, MAC, Versed 2mg, Fentanyl 100 mcg\par \par Anesthesiologist/CRNA: Mr. Alvares\par \par \par \par Medical Necessity:  Failure of conservative management.\par \par \par \par Indication for Fluoroscopy:  This procedure requires the precise placement of the spinal needle into the specific paravertebral foramen.  It is the only way to accurately and safely perform the injection.\par \par \par \par CONSENT: The possible complications including infection, bleeding, nerve damage, Hospital admission, death or failure of the procedure; though unusual, are theoretically possible. The patient was educated about the of the procedure and alternative therapies. All questions were answered and the patient freely gave consent to proceed.\par \par \par \par Monitoring: Patient had continuous blood pressure, EKG, and pulse oximetry throughout the case. See nurse's notes\par \par  \par \par PROCEDURE NOTE:\par \par After obtaining written consent, the patient was placed on the fluoroscopic table in the prone position with the pillow placed under the hips. The lumbar area was prepped with betadine solution and draped in the usual manner. A time out was performed.  Cold spray was used to localize the area. The fluoroscope was used to identify the L3///L4///L5 vertebral body on the AP projection. It was then rotated into an oblique projection until the superior articulating process of the S1 (inferior) vertebra is projected beneath the 6 o'clock position of the L5 (superior) vertebrae. The 22 gauge 5inch needle was inserted in the skin at a point overlying the superior articulating process of the inferior vertebra and aimed for the 6 o'clock position of the superior vertebrae's pedicle.  After the needle contacted bone, a lateral projection was obtained to insure that the needle tip was in proximity with the vertebral body. Paresthesias were not noted.  One ml of Omnipaque 240 was injected and a neurogram was obtained. Following demonstration of the neurogram, 1 ml of Preservative free normal saline and 1 ml of depomedrol (80mg) was injected. The small volume and relatively high concentration was chosen to preserve selectivity and diagnostic value of the injection. There was no CSF or heme identified. There were no signs of, intravascular block or hypotension. The needle was removed intact. A band aid was place on the site.\par \par  \par \par Epidurogram: The nerve root was observed in its outline on the neurogram. Distal and proximal spread was noted pointing away from epidural fibrosis/scarring.\par \par Complications: None. The patient tolerated the procedure well. \par \par  \par \par Disposition: I have examined the patient and there are no new physical findings since the original presentation.  Sensory and motor function were intact. The patient met discharge criteria see nurses notes. The discharge instruction sheet was reviewed and given to the patient. The patient was discharged home with a . If patient gets sustained relief will have patient do modified planks 3 times a day on carpet or yoga mat starting at 5 seconds building up to 1 minute without grimacing/Valsalva and walking. \par \par  \par \par Comment: 1st SNRI pt had 20% relief, 2nd SNRI today, depending on effectiveness would schedule 3rd SNRI in 1-2 weeks vs caudal epidural steroid vs follow up in office depending on insurances. Follow up office. Call if any problems\par \par \par \par  \par \par This document was electronically signed by: \par \par Taryn Kang MD, FAAPMR\par Diplomate, American Board of Physical Medicine and Rehabilitation\par Diplomate, American Board of Pain Medicine\par \par

## 2023-02-14 RX ORDER — NAPROXEN 500 MG/1
500 TABLET ORAL
Qty: 60 | Refills: 2 | Status: ACTIVE | COMMUNITY
Start: 2023-02-14 | End: 1900-01-01

## 2023-02-21 ENCOUNTER — APPOINTMENT (OUTPATIENT)
Dept: PAIN MANAGEMENT | Facility: CLINIC | Age: 83
End: 2023-02-21
Payer: MEDICARE

## 2023-02-21 VITALS
SYSTOLIC BLOOD PRESSURE: 147 MMHG | DIASTOLIC BLOOD PRESSURE: 87 MMHG | WEIGHT: 210 LBS | BODY MASS INDEX: 33.75 KG/M2 | HEIGHT: 66 IN | HEART RATE: 77 BPM

## 2023-02-21 PROCEDURE — 99213 OFFICE O/P EST LOW 20 MIN: CPT

## 2023-02-22 NOTE — HISTORY OF PRESENT ILLNESS
[FreeTextEntry1] : ORIGINAL PRESENTATION: Mrs. Abdalla is an 83 year-old female referred by Dr. Hogan complaining of back pain. She\par presents with a chief complaint of severe lower back pain radiating to the right lower extremity to the toes which began 12/2020. She states this pain is constant, severe, 10/10 on the pain scale. She states the pain is especially worse at night making sleeping and remaining asleep difficult. She has been taking Gabapentin 300mg t.i.d. with nor relief. She states the only thing that provided relief was over the counter Salon Pas patches. Of note, she has not trialed any conservative measures for this issue.  The pain started after no known injury. The patient has had this pain for 2 months. Patient describes pain as severe, 10/10 on the pain scale. During the last month the pain has been constant with symptoms worsening at night. Pain described as burning, sharp, pressure-like, throbbing. Pain is associated with numbness/pins and needles into the feet, leg. Patient has weakness in the lower extremities. Pain is increased with lying down, standing, sitting, walking, exercise, relaxation. Pain is not changed with coughing/sneezing, bowel movements. Bowel or bladder habits , loss of bladder control, frequent urination.\par \par ACTIVITIES: Patient could walk less than a block before the pain starts. The patient often lies down because of pain. Patient uses a cane at this time. Patient has difficulty performing household chores, doing outside work or shopping, exercising at this time.\par \par PRIOR PAIN TREATMENTS: Moderate relief with physical therapy, exercise, heat treatment, cold treatment. \par \par PRIOR PAIN MEDICATIONS: Tylenol, aspirin, Motrin, naproxen, Neurontin. \par \par PATIENT PRESENTS FOR FOLLOW UP : The reason for this revisit is severe lower back pain radiating to the right lower extremity to the toes. She is s/p a LT L5-S1 SNRI x1 w/ mac on 12/06/22 which provided her with 20% relief for 48 hours. She is status post 2nd LT L5-S1 SNRI on 1/20/23 which provided her with no relief. She does note she has been continuing physical therapy and has been making an effort to lose weight. She has jointed a gym and plans to try aerobic exercise. \par \par She was recently diagnosed as having polymyalgia rheumatica and she has been on a course of prednisone and has recently been started on methotrexate. She is no longer taking these medications as they provided her with no relief and they were causing her negative side effects.

## 2023-02-22 NOTE — PHYSICAL EXAM
[Normal Coordination] : normal coordination [Normal DTR UE/LE] : normal DTR UE/LE  [Normal Sensation] : normal sensation [Normal Mood and Affect] : normal mood and affect [Orientated] : orientated [Able to Communicate] : able to communicate [Well Developed] : well developed [Well Nourished] : well nourished [de-identified] : Neck: \par \par Inspection of the cervical spine is as follows: no scars, no erythema, no ecchymosis, no masses and no rashes. Palpation of the cervical spine is as follows: no trapezial spasm, no trapezial tenderness, no rhomboid spasm, no rhomboid tenderness, no paracervical spasm and no paracervical tenderness. \par \par Range of motion of the cervical spine is as follows: full range of motion with mild stiffness \par \par Strength Testing for the cervical spine is as follows: Left Deltoid strength 5/5, Right Deltoid strength 5/5, Left Biceps 5/5, Right Biceps 5/5, Left Triceps 5/5, Right Triceps 5/5, Left Wrist Flexors 5/5, Right Wrist Flexors 5/5, Left Finger Abductors 5/5, Right Finger Abductors 5/5, Left Grasp 5/5 and Right Grasp 5/5 \par \par Neurological testing for the cervical spine is as follows: light touch is intact throughout both upper extremities, normal deep tendon reflexes bilateral upper extremities, negative Spurling test, negative facet loading and negative Teresa reflex\par \par Deep tendon reflexes LEFT: Biceps 2+, Triceps 2+ and Brachioradialis 2+. Deep tendon reflexes RIGHT: Biceps 2+, Triceps 2+ and Brachioradialis 2+. Sensation of the RIGHT upper extremity is intact. Sensation of the LEFT upper extremity is intact. \par \par \par Back, including spine: \par \par Inspection of the thoracic/lumbar spine is as follows: No atrophy, erythema, ecchymosis, masses, skin discoloration, swelling and rashes. \par \par Palpation of the thoracic/lumbar spine is as follows: right lumbar paraspinal spasm and left lumbar paraspinal tenderness. Range of motion of the thoracic and lumbar spine is as follows: pain at extremes extension and stiffness at extremes extentions. \par \par Strength Testing of the thoracic and lumbar spine is as follows: motor exam is 5/5 throughout both lower extremities with normal tone and motor exam is non-focal throughout both lower extremities \par \par Special testing of the thoracic and lumbar spine is as follows: Positive sitting straight leg raise on the right. \par \par Neurological testing of the thoracic and lumbar spine is as follows: light touch is intact throughout both lower extremities, achillies and patella reflexes are symetrical, no sustained clonus at ankles, negative Babiniski test bilaterally and sensory exam is non-focal throughout both lower extremities \par \par Gait and function is as follows: antalgic gait and ambulation with cane.  \par

## 2023-02-22 NOTE — ASSESSMENT
[FreeTextEntry1] : This is a 83 year old female with a chief complaint of severe lower back pain radiating to the right lower extremity to the toes. She is s/p a L L5-S1 SNRI x1 w/ mac on 12/06/22 which provided her with 20% relief for 48 hours. She was recently diagnosed with polymyalgia rheumatica and has been utilizing Prednisone and methotrexate however, she is no longer taking these medications as they were not providing her with any relief. She is status post 2nd LT L5-S1 SNRI on 1/20/23 which provided her with no relief. She continues physical therapy and has been making an effort to exercise and lose weight. She notes she has both good days and bad days. She will follow up in 6 weeks for reevaluation. All this patients questions were answered and the conversation was understood well.\par \par \par KANNAN, Yue Mckay, attest that this documentation has been prepared under the direction and in the presence of Provider Taryn Kang MD.\par \par Thank you for allowing me to assist in the management of this patient. \par Best Regards, \par \par Taryn Kang M.D., [facility]\par \par Diplomate, American Board of Physical Medicine and Rehabilitation\par Diplomate, American Board of Pain Medicine \par Diplomate, American Board of Pain Management\par

## 2023-03-08 ENCOUNTER — APPOINTMENT (OUTPATIENT)
Dept: CARDIOLOGY | Facility: CLINIC | Age: 83
End: 2023-03-08

## 2023-04-04 ENCOUNTER — APPOINTMENT (OUTPATIENT)
Dept: PAIN MANAGEMENT | Facility: CLINIC | Age: 83
End: 2023-04-04
Payer: MEDICARE

## 2023-04-04 VITALS — BODY MASS INDEX: 33.27 KG/M2 | HEIGHT: 66 IN | WEIGHT: 207 LBS

## 2023-04-04 DIAGNOSIS — M17.12 UNILATERAL PRIMARY OSTEOARTHRITIS, LEFT KNEE: ICD-10-CM

## 2023-04-04 DIAGNOSIS — M51.37 OTHER INTERVERTEBRAL DISC DEGENERATION, LUMBOSACRAL REGION: ICD-10-CM

## 2023-04-04 PROCEDURE — 99213 OFFICE O/P EST LOW 20 MIN: CPT

## 2023-04-04 RX ORDER — NAPROXEN 500 MG/1
500 TABLET ORAL
Qty: 60 | Refills: 2 | Status: DISCONTINUED | COMMUNITY
Start: 2022-08-10 | End: 2023-04-04

## 2023-04-04 RX ORDER — NAPROXEN 500 MG/1
500 TABLET ORAL
Qty: 60 | Refills: 0 | Status: DISCONTINUED | COMMUNITY
Start: 2022-06-30 | End: 2023-04-04

## 2023-04-04 NOTE — PHYSICAL EXAM
[Normal Coordination] : normal coordination [Normal DTR UE/LE] : normal DTR UE/LE  [Normal Sensation] : normal sensation [Normal Mood and Affect] : normal mood and affect [Orientated] : orientated [Able to Communicate] : able to communicate [Well Developed] : well developed [Well Nourished] : well nourished [] : diminished ROM in all planes [Flexion] : flexion [Extension] : extension [de-identified] : Neck: \par \par Inspection of the cervical spine is as follows: no scars, no erythema, no ecchymosis, no masses and no rashes. Palpation of the cervical spine is as follows: no trapezial spasm, no trapezial tenderness, no rhomboid spasm, no rhomboid tenderness, no paracervical spasm and no paracervical tenderness. \par \par Range of motion of the cervical spine is as follows: full range of motion with mild stiffness \par \par Strength Testing for the cervical spine is as follows: Left Deltoid strength 5/5, Right Deltoid strength 5/5, Left Biceps 5/5, Right Biceps 5/5, Left Triceps 5/5, Right Triceps 5/5, Left Wrist Flexors 5/5, Right Wrist Flexors 5/5, Left Finger Abductors 5/5, Right Finger Abductors 5/5, Left Grasp 5/5 and Right Grasp 5/5 \par \par Neurological testing for the cervical spine is as follows: light touch is intact throughout both upper extremities, normal deep tendon reflexes bilateral upper extremities, negative Spurling test, negative facet loading and negative Teresa reflex\par \par Deep tendon reflexes LEFT: Biceps 2+, Triceps 2+ and Brachioradialis 2+. Deep tendon reflexes RIGHT: Biceps 2+, Triceps 2+ and Brachioradialis 2+. Sensation of the RIGHT upper extremity is intact. Sensation of the LEFT upper extremity is intact. \par \par \par Back, including spine: \par \par Inspection of the thoracic/lumbar spine is as follows: No atrophy, erythema, ecchymosis, masses, skin discoloration, swelling and rashes. \par \par Palpation of the thoracic/lumbar spine is as follows: right lumbar paraspinal spasm and left lumbar paraspinal tenderness. Range of motion of the thoracic and lumbar spine is as follows: pain at extremes extension and stiffness at extremes extentions. \par \par Strength Testing of the thoracic and lumbar spine is as follows: motor exam is 5/5 throughout both lower extremities with normal tone and motor exam is non-focal throughout both lower extremities \par \par Special testing of the thoracic and lumbar spine is as follows: Positive sitting straight leg raise on the right. \par \par Neurological testing of the thoracic and lumbar spine is as follows: light touch is intact throughout both lower extremities, achillies and patella reflexes are symetrical, no sustained clonus at ankles, negative Babiniski test bilaterally and sensory exam is non-focal throughout both lower extremities \par \par Gait and function is as follows: antalgic gait and ambulation with cane.  \par

## 2023-04-04 NOTE — ASSESSMENT
[FreeTextEntry1] : This is a 83 year old female with a chief complaint of severe lower back pain radiating to the right lower extremity to the toes. She is s/p a L L5-S1 SNRI x1 w/ mac on 12/06/22 which provided her with 20% relief for 48 hours. She was recently diagnosed with polymyalgia rheumatica and has been utilizing Prednisone and methotrexate however, she is no longer taking these medications as they were not providing her with any relief. She is status post 2nd LT L5-S1 SNRI on 1/20/23 which provided her with no relief. \par She reports that Physical therapy provides her with most relief and would like to continue. We will renew the script today. Will re-evaluate in 8 weeks.\par \par \par Jj Bell PA-C\par Taryn Kang M.D., [facility]\par \par \par

## 2023-04-19 ENCOUNTER — APPOINTMENT (OUTPATIENT)
Dept: NEUROLOGY | Facility: CLINIC | Age: 83
End: 2023-04-19
Payer: MEDICARE

## 2023-04-19 VITALS
DIASTOLIC BLOOD PRESSURE: 86 MMHG | WEIGHT: 207 LBS | HEART RATE: 70 BPM | SYSTOLIC BLOOD PRESSURE: 157 MMHG | BODY MASS INDEX: 33.27 KG/M2 | HEIGHT: 66 IN

## 2023-04-19 DIAGNOSIS — M35.3 POLYMYALGIA RHEUMATICA: ICD-10-CM

## 2023-04-19 DIAGNOSIS — G62.9 POLYNEUROPATHY, UNSPECIFIED: ICD-10-CM

## 2023-04-19 PROCEDURE — 99213 OFFICE O/P EST LOW 20 MIN: CPT

## 2023-04-20 ENCOUNTER — APPOINTMENT (OUTPATIENT)
Dept: ORTHOPEDIC SURGERY | Facility: CLINIC | Age: 83
End: 2023-04-20
Payer: MEDICARE

## 2023-04-20 DIAGNOSIS — M17.11 UNILATERAL PRIMARY OSTEOARTHRITIS, RIGHT KNEE: ICD-10-CM

## 2023-04-20 PROCEDURE — 73562 X-RAY EXAM OF KNEE 3: CPT | Mod: RT

## 2023-04-20 NOTE — REASON FOR VISIT
[FreeTextEntry2] : Follow-up on right knee pain doing therapy which helps did see Rheumatology Dr. Argueta blood work was good still using a walker fit have an epidural lumbar no help weight is still a challenge 206 lb using Naprosyn twice a day  still has problems with her neuropathy

## 2023-04-20 NOTE — ASSESSMENT
[FreeTextEntry1] : lumbar radiculopathy/polyneuropathy\par - Patient will continue to follow up with Pain Management for stimulation. \par - I Will Follow up with her in Six Months.\par \par \par \par \par \par I, Miroslava Owens, Attest that this documentation has been prepared under the direction and in the presence of Provider Vin Hogan DO\graham \par Thank You for letting me assist in the management of this patient. \par \par Vin Hogan DO\graham Board Certified, Neurology\par

## 2023-04-20 NOTE — ASSESSMENT
[FreeTextEntry1] :  we deferred on another cortisone injection for the knee continue therapy and Naprosyn the cane is advised did see neurology Dr. Hogan she does have follow-up with pain management I will see her in a few months work with weight reduction  we discussed at length at this point only other option would be knee replacement she is going to give it some thought gave her [facility]  as a resource

## 2023-04-20 NOTE — HISTORY OF PRESENT ILLNESS
[de-identified] : History of Present Illness\par 82-year-old female comes in today for follow-up visit she status post right knee arthroscopy performed with a partial\par medial and lateral meniscectomy on June 18, 2021 . She currently ambulates with a cane, she is using naproxen. doing\par physical therapy still reporting some swelling does feel significant improvement sleeping better use a cane does feel\par better occasion some tightness not traveling to [place] weight few lb

## 2023-04-20 NOTE — IMAGING
[de-identified] :  pleasant no distress struggling to get on the exam table still moderately overweight\par \par Both knees swelling crepitus limits in flexion R>L\par  x-rays right knee marked arthritic change

## 2023-04-20 NOTE — HISTORY OF PRESENT ILLNESS
[FreeTextEntry1] : Ms. Navarro is a 83 year old woman hwo comes in today for a follow up for her neuropathy & lumbar radiculopathy. Since patients last visit she has felt relief of pain. She has been going to physical therapy and does water aerobics and at home exercises with an exercising  bicycle. She had no recent falls. She has followed up with Pain management. Patient will continue to follow up with Dr. Kang for stimulation for her neuropathic pain. She is seeing Dr. Mcmillan tomorrow as well as her Rheumatologist. \par

## 2023-05-02 ENCOUNTER — APPOINTMENT (OUTPATIENT)
Dept: ORTHOPEDIC SURGERY | Facility: CLINIC | Age: 83
End: 2023-05-02
Payer: MEDICARE

## 2023-05-02 PROCEDURE — 99214 OFFICE O/P EST MOD 30 MIN: CPT

## 2023-05-05 NOTE — HISTORY OF PRESENT ILLNESS
[de-identified] : HISTORY\par RIGHT KNEE PAIN SECONDARY TO OA\par FAILED NONOP TREATMENT INCLUDING INJECTIONS, PT, AND MEDICATIONS\par WANTS TO CONSIDER TKA\par HAD PRIOR MENISECTOMY\par ALSO NOTES LOW BACK PAIN, NEUROPATHY\par HAS QUESTIONABLE HX OF AFIB BUT NOT ON ANY BLOOD THINNERS EXCEPT BABY ASA\par \par Knee pain.\par Mechanical pain made worse with activity, not completely improved with rest, interfering with ADLs.\par At this point nonoperative management of the symptoms is ineffective and the patient has interest in moving forward with a joint replacement.\par \par Past medical history is on the chart for review and as mentioned above.\par \par ROS is negative for fever, chills, CP, SOB, unexplained weight loss or gain.\par \par \par \par EXAM\par Knee is painful with PROM and limited in flexion and extension by guarding.\par Crepitation with ROM\par +JLT, TTP over the femoral condyles\par +patella grind test\par There is significant guarding throughout the exam limiting ROM testing.\par AROM demonstrated is 2-100\par NVID\par No gross instability noted but exam limited by pain/guarding. \par \par \par Imaging:\par X-rays AP and Lateral weight bearing views of the knees shows advanced degenerative changes including loss of the joint space with bone to bone apposition, osteophytes, subchondral sclerosis and cystic changes.\par \par \par Assessment: End stage arthritic knee.\par \par \par Plan is for a total knee replacement.\par The surgery, preoperative workup, surgical procedure, hospital course, post operative course, rehab, and expected outcomes were discussed.\par A description of the surgery in layman's terms, using models equivalent to the implants used during surgery, was a part of this conversation.\par The risks and benefits of the surgery were discussed.  Benefits  were described as improvement in pain and function.  Risks including bleeding, infection, blood clots, DVT, PE, stroke, heart attack, fracture, loss of motion, arthrofibrosis, implant loosening, instability, nerve palsy, neurovascular injury, persistent pain, RSD, and in rare cases death.\par Should the implant not function as expected or wear out then revision surgery may be required in the future. \par \par We will proceed with scheduling the procedure.\par \par

## 2023-05-11 ENCOUNTER — APPOINTMENT (OUTPATIENT)
Dept: ORTHOPEDIC SURGERY | Facility: CLINIC | Age: 83
End: 2023-05-11

## 2023-05-16 NOTE — HISTORY OF PRESENT ILLNESS
[FreeTextEntry1] : ORIGINAL PRESENTATION: Mrs. Abdalla is an 83 year-old female referred by Dr. Hogan complaining of back pain. She\par presents with a chief complaint of severe lower back pain radiating to the right lower extremity to the toes which began 12/2020. She states this pain is constant, severe, 10/10 on the pain scale. She states the pain is especially worse at night making sleeping and remaining asleep difficult. She has been taking Gabapentin 300mg t.i.d. with nor relief. She states the only thing that provided relief was over the counter Salon Pas patches. Of note, she has not trialed any conservative measures for this issue.  The pain started after no known injury. The patient has had this pain for 2 months. Patient describes pain as severe, 10/10 on the pain scale. During the last month the pain has been constant with symptoms worsening at night. Pain described as burning, sharp, pressure-like, throbbing. Pain is associated with numbness/pins and needles into the feet, leg. Patient has weakness in the lower extremities. Pain is increased with lying down, standing, sitting, walking, exercise, relaxation. Pain is not changed with coughing/sneezing, bowel movements. Bowel or bladder habits , loss of bladder control, frequent urination.\par \par ACTIVITIES: Patient could walk less than a block before the pain starts. The patient often lies down because of pain. Patient uses a cane at this time. Patient has difficulty performing household chores, doing outside work or shopping, exercising at this time.\par \par PRIOR PAIN TREATMENTS: Moderate relief with physical therapy, exercise, heat treatment, cold treatment. \par \par PRIOR PAIN MEDICATIONS: Tylenol, aspirin, Motrin, naproxen, Neurontin. \par \par PATIENT PRESENTS FOR FOLLOW UP : Last seen on 02/21/2023 and since then there has been no new complaints or acute changes.The reason for this revisit is severe lower back pain radiating to the right lower extremity to the toes. She is s/p a LT L5-S1 SNRI x1 w/ mac on 12/06/22 which provided her with 20% relief for 48 hours. She is status post 2nd LT L5-S1 SNRI on 1/20/23 which provided her with no relief. She does note she has been continuing physical therapy with good improvement,reporting that it helps the most.\par \par She was recently diagnosed as having polymyalgia rheumatica and she has been on a course of prednisone and has recently been started on methotrexate. She is no longer taking these medications as they provided her with no relief and they were causing her negative side effects. 
113

## 2023-06-01 ENCOUNTER — APPOINTMENT (OUTPATIENT)
Dept: PAIN MANAGEMENT | Facility: CLINIC | Age: 83
End: 2023-06-01

## 2023-07-11 ENCOUNTER — OUTPATIENT (OUTPATIENT)
Dept: OUTPATIENT SERVICES | Facility: HOSPITAL | Age: 83
LOS: 1 days | End: 2023-07-11
Payer: MEDICARE

## 2023-07-11 VITALS
TEMPERATURE: 98 F | HEART RATE: 66 BPM | HEIGHT: 66 IN | RESPIRATION RATE: 18 BRPM | DIASTOLIC BLOOD PRESSURE: 68 MMHG | WEIGHT: 207.01 LBS | OXYGEN SATURATION: 98 % | SYSTOLIC BLOOD PRESSURE: 143 MMHG

## 2023-07-11 DIAGNOSIS — Z90.49 ACQUIRED ABSENCE OF OTHER SPECIFIED PARTS OF DIGESTIVE TRACT: Chronic | ICD-10-CM

## 2023-07-11 DIAGNOSIS — Z98.890 OTHER SPECIFIED POSTPROCEDURAL STATES: Chronic | ICD-10-CM

## 2023-07-11 DIAGNOSIS — Z90.89 ACQUIRED ABSENCE OF OTHER ORGANS: Chronic | ICD-10-CM

## 2023-07-11 DIAGNOSIS — Z90.710 ACQUIRED ABSENCE OF BOTH CERVIX AND UTERUS: Chronic | ICD-10-CM

## 2023-07-11 DIAGNOSIS — Z01.818 ENCOUNTER FOR OTHER PREPROCEDURAL EXAMINATION: ICD-10-CM

## 2023-07-11 DIAGNOSIS — M17.11 UNILATERAL PRIMARY OSTEOARTHRITIS, RIGHT KNEE: ICD-10-CM

## 2023-07-11 LAB
A1C WITH ESTIMATED AVERAGE GLUCOSE RESULT: 5.9 % — HIGH (ref 4–5.6)
ALBUMIN SERPL ELPH-MCNC: 4.5 G/DL — SIGNIFICANT CHANGE UP (ref 3.5–5.2)
ALP SERPL-CCNC: 60 U/L — SIGNIFICANT CHANGE UP (ref 30–115)
ALT FLD-CCNC: 14 U/L — SIGNIFICANT CHANGE UP (ref 0–41)
ANION GAP SERPL CALC-SCNC: 15 MMOL/L — HIGH (ref 7–14)
APTT BLD: 25.2 SEC — LOW (ref 27–39.2)
AST SERPL-CCNC: 20 U/L — SIGNIFICANT CHANGE UP (ref 0–41)
BASOPHILS # BLD AUTO: 0.04 K/UL — SIGNIFICANT CHANGE UP (ref 0–0.2)
BASOPHILS NFR BLD AUTO: 0.4 % — SIGNIFICANT CHANGE UP (ref 0–1)
BILIRUB SERPL-MCNC: 0.4 MG/DL — SIGNIFICANT CHANGE UP (ref 0.2–1.2)
BLD GP AB SCN SERPL QL: SIGNIFICANT CHANGE UP
BUN SERPL-MCNC: 23 MG/DL — HIGH (ref 10–20)
CALCIUM SERPL-MCNC: 9.7 MG/DL — SIGNIFICANT CHANGE UP (ref 8.4–10.5)
CHLORIDE SERPL-SCNC: 103 MMOL/L — SIGNIFICANT CHANGE UP (ref 98–110)
CO2 SERPL-SCNC: 23 MMOL/L — SIGNIFICANT CHANGE UP (ref 17–32)
CREAT SERPL-MCNC: 0.8 MG/DL — SIGNIFICANT CHANGE UP (ref 0.7–1.5)
EGFR: 74 ML/MIN/1.73M2 — SIGNIFICANT CHANGE UP
EOSINOPHIL # BLD AUTO: 0.1 K/UL — SIGNIFICANT CHANGE UP (ref 0–0.7)
EOSINOPHIL NFR BLD AUTO: 1 % — SIGNIFICANT CHANGE UP (ref 0–8)
ESTIMATED AVERAGE GLUCOSE: 123 MG/DL — HIGH (ref 68–114)
GLUCOSE SERPL-MCNC: 89 MG/DL — SIGNIFICANT CHANGE UP (ref 70–99)
HCT VFR BLD CALC: 44.2 % — SIGNIFICANT CHANGE UP (ref 37–47)
HGB BLD-MCNC: 14.4 G/DL — SIGNIFICANT CHANGE UP (ref 12–16)
IMM GRANULOCYTES NFR BLD AUTO: 0.2 % — SIGNIFICANT CHANGE UP (ref 0.1–0.3)
INR BLD: 0.96 RATIO — SIGNIFICANT CHANGE UP (ref 0.65–1.3)
LYMPHOCYTES # BLD AUTO: 1.78 K/UL — SIGNIFICANT CHANGE UP (ref 1.2–3.4)
LYMPHOCYTES # BLD AUTO: 18.5 % — LOW (ref 20.5–51.1)
MCHC RBC-ENTMCNC: 30 PG — SIGNIFICANT CHANGE UP (ref 27–31)
MCHC RBC-ENTMCNC: 32.6 G/DL — SIGNIFICANT CHANGE UP (ref 32–37)
MCV RBC AUTO: 92.1 FL — SIGNIFICANT CHANGE UP (ref 81–99)
MONOCYTES # BLD AUTO: 0.86 K/UL — HIGH (ref 0.1–0.6)
MONOCYTES NFR BLD AUTO: 8.9 % — SIGNIFICANT CHANGE UP (ref 1.7–9.3)
MRSA PCR RESULT.: NEGATIVE — SIGNIFICANT CHANGE UP
NEUTROPHILS # BLD AUTO: 6.81 K/UL — HIGH (ref 1.4–6.5)
NEUTROPHILS NFR BLD AUTO: 71 % — SIGNIFICANT CHANGE UP (ref 42.2–75.2)
NRBC # BLD: 0 /100 WBCS — SIGNIFICANT CHANGE UP (ref 0–0)
PLATELET # BLD AUTO: 217 K/UL — SIGNIFICANT CHANGE UP (ref 130–400)
PMV BLD: 11.5 FL — HIGH (ref 7.4–10.4)
POTASSIUM SERPL-MCNC: 4.6 MMOL/L — SIGNIFICANT CHANGE UP (ref 3.5–5)
POTASSIUM SERPL-SCNC: 4.6 MMOL/L — SIGNIFICANT CHANGE UP (ref 3.5–5)
PROT SERPL-MCNC: 7 G/DL — SIGNIFICANT CHANGE UP (ref 6–8)
PROTHROM AB SERPL-ACNC: 10.9 SEC — SIGNIFICANT CHANGE UP (ref 9.95–12.87)
RBC # BLD: 4.8 M/UL — SIGNIFICANT CHANGE UP (ref 4.2–5.4)
RBC # FLD: 13.8 % — SIGNIFICANT CHANGE UP (ref 11.5–14.5)
SODIUM SERPL-SCNC: 141 MMOL/L — SIGNIFICANT CHANGE UP (ref 135–146)
WBC # BLD: 9.61 K/UL — SIGNIFICANT CHANGE UP (ref 4.8–10.8)
WBC # FLD AUTO: 9.61 K/UL — SIGNIFICANT CHANGE UP (ref 4.8–10.8)

## 2023-07-11 PROCEDURE — 86901 BLOOD TYPING SEROLOGIC RH(D): CPT

## 2023-07-11 PROCEDURE — 83036 HEMOGLOBIN GLYCOSYLATED A1C: CPT

## 2023-07-11 PROCEDURE — 85730 THROMBOPLASTIN TIME PARTIAL: CPT

## 2023-07-11 PROCEDURE — 73562 X-RAY EXAM OF KNEE 3: CPT | Mod: 26,RT

## 2023-07-11 PROCEDURE — 73562 X-RAY EXAM OF KNEE 3: CPT | Mod: RT

## 2023-07-11 PROCEDURE — 93005 ELECTROCARDIOGRAM TRACING: CPT

## 2023-07-11 PROCEDURE — 87640 STAPH A DNA AMP PROBE: CPT

## 2023-07-11 PROCEDURE — 85610 PROTHROMBIN TIME: CPT

## 2023-07-11 PROCEDURE — 87641 MR-STAPH DNA AMP PROBE: CPT

## 2023-07-11 PROCEDURE — 86850 RBC ANTIBODY SCREEN: CPT

## 2023-07-11 PROCEDURE — 80053 COMPREHEN METABOLIC PANEL: CPT

## 2023-07-11 PROCEDURE — 71046 X-RAY EXAM CHEST 2 VIEWS: CPT | Mod: 26

## 2023-07-11 PROCEDURE — 85025 COMPLETE CBC W/AUTO DIFF WBC: CPT

## 2023-07-11 PROCEDURE — 86900 BLOOD TYPING SEROLOGIC ABO: CPT

## 2023-07-11 PROCEDURE — 72170 X-RAY EXAM OF PELVIS: CPT | Mod: 26

## 2023-07-11 PROCEDURE — 72170 X-RAY EXAM OF PELVIS: CPT

## 2023-07-11 PROCEDURE — 71046 X-RAY EXAM CHEST 2 VIEWS: CPT

## 2023-07-11 PROCEDURE — 36415 COLL VENOUS BLD VENIPUNCTURE: CPT

## 2023-07-11 PROCEDURE — 99214 OFFICE O/P EST MOD 30 MIN: CPT | Mod: 25

## 2023-07-11 PROCEDURE — 93010 ELECTROCARDIOGRAM REPORT: CPT

## 2023-07-11 RX ORDER — GABAPENTIN 400 MG/1
1 CAPSULE ORAL
Qty: 0 | Refills: 0 | DISCHARGE

## 2023-07-11 NOTE — H&P PST ADULT - NSICDXPASTMEDICALHX_GEN_ALL_CORE_FT
PAST MEDICAL HISTORY:  Afib AGE 72 X1 EPISODE    Asymptomatic hypertension     Early stage skin cancer SQAMOUS AND BASAL CELL SKIN CA    Low back pain BULGING DISCS LUMBAR SPINE    Mitral regurgitation     Murmur, heart     OA (osteoarthritis)     Obesity     Peripheral neuropathy

## 2023-07-11 NOTE — H&P PST ADULT - BP NONINVASIVE DIASTOLIC (MM HG)
October 12, 2017      Elvis Ruiz MD  [address]  [address]  [facility] [address]           [facility]  [address]  [place] [address]  Phone: [phone]  Fax: [phone]          Patient: Jarrett Charlton Jr.   MR Number: [id number]   YOB: 1947   Date of Visit: 10/12/2017       Dear Dr. Elivs Ruiz:    Thank you for referring Jarrett Charlton to me for evaluation. Attached you will find relevant portions of my assessment and plan of care.    If you have questions, please do not hesitate to call me. I look forward to following Jarrett Charlton along with you.    Sincerely,    Florencia Koch MD    Enclosure  CC:  No Recipients    If you would like to receive this communication electronically, please contact [email or web address] or [phone] to request more information on [email or web address] Link access.    For providers and/or their staff who would like to refer a patient to [facility], please contact us through our one-stop-shop provider referral line, [facility], at [phone].    If you feel you have received this communication in error or would no longer like to receive these types of communications, please e-mail [email or web address]         
68

## 2023-07-11 NOTE — H&P PST ADULT - REASON FOR ADMISSION
84 Y/O F SCHEDULED FOR PAST FOR Right Total Knee Replacement UNDER REGIONAL ANESTHESIA WITH DR HIP-PETERSON ON 7/24/23. PT REPORTS RIGHT KNEE PAIN THAT BEGAN ABOUT 2018. PT REPORTS PAIN 9/10 CONSTANT THROBBING PAIN. SHE USES TOPICAL ANALGESICS AND NAPROXEN WITH MINIMAL RELIEF.

## 2023-07-11 NOTE — H&P PST ADULT - HISTORY OF PRESENT ILLNESS
CURRENTLY  DENIES ANY CP, SOB, PALPITATIONS, COUGH OR DYSURIA  EXERCISE TOLERANCE <1 FOS/ BLOCK WITHOUT SOB-LIMITED LISA TO PAIN  USES WALKER/CANE FOR STABILITY     AS PER PATIENT  this is his/her complete medical history including medications - PRESCRIPTIONS  OVER THE COUNTER MEDS    pt denies any covid s/s, 2021 tested positive in the past.  Received covid vaccine X 2 DOSES    Anesthesia Alert  YES--Difficult Airway CLASS IV   NO--History of neck surgery or radiation  NO--Limited ROM of neck  NO--History of Malignant hyperthermia  NO--No personal or family history of Pseudocholinesterase deficiency.  NO--Prior Anesthesia Complication  YES--Latex Allergy  NO--Loose teeth  YES--History of Rheumatoid Arthritis B/L SHOULDERS  NO--YOLY  NO--Bleeding risk  NO--Other_____    Patient verbalized understanding of instructions and was given the opportunity to ask questions and have them answered.

## 2023-07-12 DIAGNOSIS — M17.11 UNILATERAL PRIMARY OSTEOARTHRITIS, RIGHT KNEE: ICD-10-CM

## 2023-07-12 DIAGNOSIS — Z01.818 ENCOUNTER FOR OTHER PREPROCEDURAL EXAMINATION: ICD-10-CM

## 2023-07-20 ENCOUNTER — APPOINTMENT (OUTPATIENT)
Dept: ORTHOPEDIC SURGERY | Facility: CLINIC | Age: 83
End: 2023-07-20

## 2023-07-24 ENCOUNTER — TRANSCRIPTION ENCOUNTER (OUTPATIENT)
Age: 83
End: 2023-07-24

## 2023-07-24 ENCOUNTER — RESULT REVIEW (OUTPATIENT)
Age: 83
End: 2023-07-24

## 2023-07-24 ENCOUNTER — APPOINTMENT (OUTPATIENT)
Dept: ORTHOPEDIC SURGERY | Facility: HOSPITAL | Age: 83
End: 2023-07-24

## 2023-07-24 ENCOUNTER — INPATIENT (INPATIENT)
Facility: HOSPITAL | Age: 83
LOS: 0 days | Discharge: HOME HEALTH | DRG: 470 | End: 2023-07-25
Attending: ORTHOPAEDIC SURGERY | Admitting: ORTHOPAEDIC SURGERY
Payer: MEDICARE

## 2023-07-24 VITALS
DIASTOLIC BLOOD PRESSURE: 68 MMHG | HEIGHT: 66 IN | RESPIRATION RATE: 18 BRPM | HEART RATE: 68 BPM | SYSTOLIC BLOOD PRESSURE: 144 MMHG | OXYGEN SATURATION: 95 % | TEMPERATURE: 96 F | WEIGHT: 210.1 LBS

## 2023-07-24 DIAGNOSIS — M17.11 UNILATERAL PRIMARY OSTEOARTHRITIS, RIGHT KNEE: ICD-10-CM

## 2023-07-24 DIAGNOSIS — Z98.890 OTHER SPECIFIED POSTPROCEDURAL STATES: Chronic | ICD-10-CM

## 2023-07-24 DIAGNOSIS — Z90.710 ACQUIRED ABSENCE OF BOTH CERVIX AND UTERUS: Chronic | ICD-10-CM

## 2023-07-24 DIAGNOSIS — Z90.49 ACQUIRED ABSENCE OF OTHER SPECIFIED PARTS OF DIGESTIVE TRACT: Chronic | ICD-10-CM

## 2023-07-24 DIAGNOSIS — Z90.89 ACQUIRED ABSENCE OF OTHER ORGANS: Chronic | ICD-10-CM

## 2023-07-24 LAB — GLUCOSE BLDC GLUCOMTR-MCNC: 166 MG/DL — HIGH (ref 70–99)

## 2023-07-24 PROCEDURE — 27447 TOTAL KNEE ARTHROPLASTY: CPT | Mod: RT

## 2023-07-24 PROCEDURE — 73560 X-RAY EXAM OF KNEE 1 OR 2: CPT | Mod: RT

## 2023-07-24 PROCEDURE — 88305 TISSUE EXAM BY PATHOLOGIST: CPT | Mod: 26

## 2023-07-24 PROCEDURE — 73560 X-RAY EXAM OF KNEE 1 OR 2: CPT | Mod: 26,RT

## 2023-07-24 PROCEDURE — 36415 COLL VENOUS BLD VENIPUNCTURE: CPT

## 2023-07-24 PROCEDURE — 88311 DECALCIFY TISSUE: CPT | Mod: 26

## 2023-07-24 PROCEDURE — 97162 PT EVAL MOD COMPLEX 30 MIN: CPT | Mod: GP

## 2023-07-24 PROCEDURE — 80048 BASIC METABOLIC PNL TOTAL CA: CPT

## 2023-07-24 PROCEDURE — 85027 COMPLETE CBC AUTOMATED: CPT

## 2023-07-24 PROCEDURE — 97165 OT EVAL LOW COMPLEX 30 MIN: CPT | Mod: GO

## 2023-07-24 RX ORDER — CEFAZOLIN SODIUM 1 G
2000 VIAL (EA) INJECTION EVERY 8 HOURS
Refills: 0 | Status: COMPLETED | OUTPATIENT
Start: 2023-07-24 | End: 2023-07-25

## 2023-07-24 RX ORDER — ASPIRIN/CALCIUM CARB/MAGNESIUM 324 MG
81 TABLET ORAL EVERY 12 HOURS
Refills: 0 | Status: DISCONTINUED | OUTPATIENT
Start: 2023-07-24 | End: 2023-07-25

## 2023-07-24 RX ORDER — OXYCODONE HYDROCHLORIDE 5 MG/1
5 TABLET ORAL EVERY 8 HOURS
Refills: 0 | Status: DISCONTINUED | OUTPATIENT
Start: 2023-07-24 | End: 2023-07-25

## 2023-07-24 RX ORDER — POLYETHYLENE GLYCOL 3350 17 G/17G
17 POWDER, FOR SOLUTION ORAL AT BEDTIME
Refills: 0 | Status: DISCONTINUED | OUTPATIENT
Start: 2023-07-24 | End: 2023-07-25

## 2023-07-24 RX ORDER — KETOROLAC TROMETHAMINE 30 MG/ML
15 SYRINGE (ML) INJECTION EVERY 6 HOURS
Refills: 0 | Status: DISCONTINUED | OUTPATIENT
Start: 2023-07-24 | End: 2023-07-25

## 2023-07-24 RX ORDER — SODIUM CHLORIDE 9 MG/ML
1000 INJECTION, SOLUTION INTRAVENOUS
Refills: 0 | Status: DISCONTINUED | OUTPATIENT
Start: 2023-07-24 | End: 2023-07-24

## 2023-07-24 RX ORDER — ACETAMINOPHEN 500 MG
1000 TABLET ORAL ONCE
Refills: 0 | Status: COMPLETED | OUTPATIENT
Start: 2023-07-24 | End: 2023-07-24

## 2023-07-24 RX ORDER — ONDANSETRON 8 MG/1
4 TABLET, FILM COATED ORAL ONCE
Refills: 0 | Status: DISCONTINUED | OUTPATIENT
Start: 2023-07-24 | End: 2023-07-24

## 2023-07-24 RX ORDER — ONDANSETRON 8 MG/1
4 TABLET, FILM COATED ORAL EVERY 6 HOURS
Refills: 0 | Status: DISCONTINUED | OUTPATIENT
Start: 2023-07-24 | End: 2023-07-25

## 2023-07-24 RX ORDER — TRAMADOL HYDROCHLORIDE 50 MG/1
50 TABLET ORAL EVERY 4 HOURS
Refills: 0 | Status: DISCONTINUED | OUTPATIENT
Start: 2023-07-24 | End: 2023-07-25

## 2023-07-24 RX ORDER — ACETAMINOPHEN 500 MG
650 TABLET ORAL EVERY 6 HOURS
Refills: 0 | Status: DISCONTINUED | OUTPATIENT
Start: 2023-07-24 | End: 2023-07-25

## 2023-07-24 RX ORDER — SENNA PLUS 8.6 MG/1
2 TABLET ORAL AT BEDTIME
Refills: 0 | Status: DISCONTINUED | OUTPATIENT
Start: 2023-07-24 | End: 2023-07-25

## 2023-07-24 RX ORDER — HYDROMORPHONE HYDROCHLORIDE 2 MG/ML
0.5 INJECTION INTRAMUSCULAR; INTRAVENOUS; SUBCUTANEOUS
Refills: 0 | Status: DISCONTINUED | OUTPATIENT
Start: 2023-07-24 | End: 2023-07-24

## 2023-07-24 RX ORDER — MAGNESIUM HYDROXIDE 400 MG/1
30 TABLET, CHEWABLE ORAL DAILY
Refills: 0 | Status: DISCONTINUED | OUTPATIENT
Start: 2023-07-24 | End: 2023-07-25

## 2023-07-24 RX ORDER — SODIUM CHLORIDE 9 MG/ML
1000 INJECTION INTRAMUSCULAR; INTRAVENOUS; SUBCUTANEOUS
Refills: 0 | Status: DISCONTINUED | OUTPATIENT
Start: 2023-07-24 | End: 2023-07-25

## 2023-07-24 RX ORDER — CHLORHEXIDINE GLUCONATE 213 G/1000ML
1 SOLUTION TOPICAL
Refills: 0 | Status: DISCONTINUED | OUTPATIENT
Start: 2023-07-24 | End: 2023-07-25

## 2023-07-24 RX ORDER — DEXAMETHASONE 0.5 MG/5ML
2 ELIXIR ORAL ONCE
Refills: 0 | Status: COMPLETED | OUTPATIENT
Start: 2023-07-25 | End: 2023-07-25

## 2023-07-24 RX ADMIN — Medication 15 MILLIGRAM(S): at 22:19

## 2023-07-24 RX ADMIN — Medication 15 MILLIGRAM(S): at 23:26

## 2023-07-24 RX ADMIN — Medication 650 MILLIGRAM(S): at 23:43

## 2023-07-24 RX ADMIN — Medication 650 MILLIGRAM(S): at 18:31

## 2023-07-24 RX ADMIN — POLYETHYLENE GLYCOL 3350 17 GRAM(S): 17 POWDER, FOR SOLUTION ORAL at 22:20

## 2023-07-24 RX ADMIN — Medication 15 MILLIGRAM(S): at 15:14

## 2023-07-24 RX ADMIN — Medication 1000 MILLIGRAM(S): at 13:32

## 2023-07-24 RX ADMIN — SODIUM CHLORIDE 75 MILLILITER(S): 9 INJECTION INTRAMUSCULAR; INTRAVENOUS; SUBCUTANEOUS at 18:13

## 2023-07-24 RX ADMIN — Medication 1000 MILLIGRAM(S): at 08:03

## 2023-07-24 RX ADMIN — Medication 650 MILLIGRAM(S): at 18:12

## 2023-07-24 RX ADMIN — Medication 81 MILLIGRAM(S): at 18:12

## 2023-07-24 RX ADMIN — Medication 100 MILLIGRAM(S): at 18:31

## 2023-07-24 RX ADMIN — SODIUM CHLORIDE 75 MILLILITER(S): 9 INJECTION, SOLUTION INTRAVENOUS at 14:19

## 2023-07-24 RX ADMIN — SENNA PLUS 2 TABLET(S): 8.6 TABLET ORAL at 22:19

## 2023-07-24 RX ADMIN — Medication 15 MILLIGRAM(S): at 15:00

## 2023-07-24 NOTE — ASU PATIENT PROFILE, ADULT - FALL HARM RISK - HARM RISK INTERVENTIONS

## 2023-07-24 NOTE — DISCHARGE NOTE PROVIDER - NSDCCPCAREPLAN_GEN_ALL_CORE_FT
PRINCIPAL DISCHARGE DIAGNOSIS  Diagnosis: Arthritis of right knee  Assessment and Plan of Treatment: 83 year old female with a past medical history of afib, htn, and peripheral neuropathy, was admitted for an elective Total Knee Arthroplasty. The patient tolerated surgery well with no intra/post operative complications. She received intra/post operative antibiotics for infection prophylaxis and will be discharged on Aspirin 81mg BID for 30 days to lower the risk of blood clots. She worked with Physical Therapy while admitted to the hospital and is stable for discharge.   Keep surgical site clean and dry, may remove dressing in 7  days . Call your surgeon if any wound drainage, redness , increasing pain, fevers over 101 or if you have any questions or concerns.  Ice pack to affected area q4-6h as needed   You may shower with the bandage on and once it is removed. Once it is removed  , do not scrub surgical site. Do not apply any lotions/moisturizers/creams to surgical site.  Call to make your  post op appointment if you do not have one already.   After completing of aspirin 81 mg twice daily for 30 days , you may resume your usual once daily dosage.

## 2023-07-24 NOTE — ASU PATIENT PROFILE, ADULT - NSICDXPASTMEDICALHX_GEN_ALL_CORE_FT
PAST MEDICAL HISTORY:  Afib AGE 72 X1 EPISODE    Asymptomatic hypertension     Early stage skin cancer SQAMOUS AND BASAL CELL SKIN CA    History of medical problems hiatal hernia, tia no residual    Low back pain BULGING DISCS LUMBAR SPINE    Mitral regurgitation     Murmur, heart     OA (osteoarthritis)     Obesity with body mass index (BMI) of 30.0 to 39.9     Peripheral neuropathy

## 2023-07-24 NOTE — ASU PATIENT PROFILE, ADULT - NSICDXPASTSURGICALHX_GEN_ALL_CORE_FT
PAST SURGICAL HISTORY:  H/O abdominal hysterectomy     History of cholecystectomy     History of surgery REMOVAL OF RIGHT ANKLE SKIN CANCER. right knee miniscus repair    History of tonsillectomy

## 2023-07-24 NOTE — DISCHARGE NOTE PROVIDER - NSDCFUSCHEDAPPT_GEN_ALL_CORE_FT
[facility] Physician [facility]  [address] [address]  Scheduled Appointment: 08/18/2023

## 2023-07-24 NOTE — ASU PATIENT PROFILE, ADULT - WHEN WAS YOUR LAST VACCINATION? YEAR
2021 Ear Wedge Repair Text: A wedge excision was completed by carrying down an excision through the full thickness of the ear and cartilage with an inward facing Burow's triangle. The wound was then closed in a layered fashion.

## 2023-07-24 NOTE — PATIENT PROFILE ADULT - FALL HARM RISK - HARM RISK INTERVENTIONS
Assistance with ambulation/Assistance OOB with selected safe patient handling equipment/Communicate Risk of Fall with Harm to all staff/Discuss with provider need for PT consult/Monitor gait and stability/Provide patient with walking aids - walker, cane, crutches/Reinforce activity limits and safety measures with patient and family/Sit up slowly, dangle for a short time, stand at bedside before walking/Tailored Fall Risk Interventions/Use of alarms - bed, chair and/or voice tab/Visual Cue: Yellow wristband and red socks/Bed in lowest position, wheels locked, appropriate side rails in place/Call bell, personal items and telephone in reach/Instruct patient to call for assistance before getting out of bed or chair/Non-slip footwear when patient is out of bed/[place] to call system/Physically safe environment - no spills, clutter or unnecessary equipment/Purposeful Proactive Rounding/Room/bathroom lighting operational, light cord in reach

## 2023-07-24 NOTE — DISCHARGE NOTE PROVIDER - HOSPITAL COURSE
83 year old female with a past medical history of afib, htn, and peripheral neuropathy, was admitted for an elective Total Knee Arthroplasty. The patient tolerated surgery well with no intra/post operative complications. She received intra/post operative antibiotics for infection prophylaxis and will be discharged on Aspirin 81mg BID for 30 days to lower the risk of blood clots. She worked with Physical Therapy while admitted to the hospital and is stable for discharge.

## 2023-07-24 NOTE — ASU PATIENT PROFILE, ADULT - NS TRANSFER PATIENT BELONGINGS
QUARANTINE FOR 5D after test positivity  Household exposures should quarantine 10 days if feeling symptoms, or 14 days if asymptomatic     May take over the counter medications for symptoms including motrin, tylenol, any cough, cold or flu medications    Hydration  Vitamin D/multivitamin supplementation x 10d  Zinc Lozenges 3x daily x 10d     
walker, purse/Other belongings/Clothing

## 2023-07-24 NOTE — PATIENT PROFILE ADULT - NSPROPASSIVESMOKEEXPOSURE_GEN_A_NUR
Tianna Gage is a 52 year old male presenting for   Chief Complaint   Patient presents with   • Physical   .   Tetanus Vaccine reviewed and is current.    CONCERNS: intermittent cough. Mostly when he lays down. Ongoing for about 2 months. Reports productive cough. Denies SOB.   Denies fevers, n/v, diarrhea.    Feels lethargic.     PCP- Jana Sales MD     Health Maintenance:  Health Maintenance Due   Topic Date Due   • Influenza Vaccine (1) 09/01/2019   • Colorectal Cancer Screening-Colonoscopy  10/29/2019   • Shingles Vaccine (1 of 2) 10/29/2019       Patient is due for topics as listed above but is not proceeding with Immunization(s) Influenza at this time.     Denies known Latex allergy or symptoms of Latex sensitivity.   Medications reviewed with patient.  Pharmacy verified.   Tobacco hx verified.     Phone number verified:        Cell Phone:   Telephone Information:   Mobile [phone]     Okay to leave a message containing results? Yes     Prefers popADhart communication     Last lab results:   Hemoglobin A1C (%)   Date Value   01/03/2020 5.9 (H)     CHOLESTEROL (mg/dL)   Date Value   01/03/2020 282 (H)     HDL (mg/dL)   Date Value   01/03/2020 76     TRIGLYCERIDE (mg/dL)   Date Value   01/03/2020 166 (H)     CALCULATED LDL (mg/dL)   Date Value   01/03/2020 173 (H)      No

## 2023-07-24 NOTE — DISCHARGE NOTE PROVIDER - CARE PROVIDER_API CALL
Antonino Sargent  Orthopaedic Surgery  [address] [place] [address]  Phone: [phone]  Fax: [phone]  Follow Up Time:

## 2023-07-24 NOTE — ASU PATIENT PROFILE, ADULT - NS PREOP UNDERSTANDS INFO
Patient called stating he had a missed call from this number and was calling us back. I looked and couldn't find a reason why he was called except for he was due for labs today at 2:15.  
yes

## 2023-07-24 NOTE — CHART NOTE - NSCHARTNOTEFT_GEN_A_CORE
PACU ANESTHESIA ADMISSION NOTE      Procedure: Right total knee replacement      Post op diagnosis:  Osteoarthritis of right knee        ____  Intubated  TV:______       Rate: ______      FiO2: ______    __x__  Patent Airway    __x__  Full return of protective reflexes    ____  Full recovery from anesthesia / back to baseline     Vitals:   T: 97.5          R:     18             BP:  121/59                Sat:  95                 P: 68      Mental Status:  __x__ Awake   ___x__ Alert   _____ Drowsy   _____ Sedated    Nausea/Vomiting:  ____ NO  ______Yes,   See Post - Op Orders          Pain Scale (0-10):  ___0__    Treatment: ____ None    ____ See Post - Op/PCA Orders    Post - Operative Fluids:   ____ Oral   ___x_ See Post - Op Orders    Plan: Discharge:   ____Home       __x___Floor     _____Critical Care    _____  Other:_________________    Comments:

## 2023-07-24 NOTE — PATIENT PROFILE ADULT - FUNCTIONAL ASSESSMENT - BASIC MOBILITY 6.
Provided Willa the EMILY scheduling number. I also let her know to reach out to us via [employer] to let us know when she finds an EMILY appt. I also let her know to reach out in case her EMILY appt is too far into the future. We can help facilitate the transition to a CDI.     - Omid HOLGUIN ATC  
3 = A little assistance

## 2023-07-24 NOTE — DISCHARGE NOTE PROVIDER - NSDCMRMEDTOKEN_GEN_ALL_CORE_FT
aspirin 81 mg oral tablet: 1 tab(s) orally once a day  enalapril 5 mg oral tablet: 1 tab(s) orally 2 times a day  naproxen 500 mg oral tablet: 1 tab(s) orally 2 times a day   acetaminophen 325 mg oral tablet: 2 tab(s) orally every 6 hours MDD: 8  aspirin 81 mg oral delayed release tablet: 1 tab(s) orally every 12 hours MDD: 2  enalapril 5 mg oral tablet: 1 tab(s) orally 2 times a day  naloxone 4 mg/0.1 mL nasal spray: 4 milligram(s) intranasally once  naproxen 500 mg oral tablet: 1 tab(s) orally 2 times a day  oxyCODONE 5 mg oral tablet: 1 tab(s) orally every 8 hours as needed for breakthrough pain MDD: 3  senna leaf extract oral tablet: 2 tab(s) orally once a day (at bedtime) MDD: 2  traMADol 50 mg oral tablet: 1 tab(s) orally every 4 hours as needed for Severe Pain (7 - 10) MDD: 6

## 2023-07-24 NOTE — PATIENT PROFILE ADULT - VISION (WITH CORRECTIVE LENSES IF THE PATIENT USUALLY WEARS THEM):
ESRD on dialysis
Normal vision: sees adequately in most situations; can see medication labels, newsprint

## 2023-07-24 NOTE — ASU PREOP CHECKLIST - WEIGHT IN KG
Anesthetic History               Review of Systems / Medical History      Pulmonary                   Neuro/Psych              Cardiovascular    Hypertension: well controlled                   GI/Hepatic/Renal     GERD: well controlled           Endo/Other    Diabetes: well controlled, type 2         Other Findings   Comments: Risk Factors for Postoperative nausea/vomiting:       History of postoperative nausea/vomiting? NO       Female? YES       Motion sickness? NO       Intended opioid administration for postoperative analgesia? NO      Smoking Abstinence  Current Smoker? NO  Elective Surgery? YES  Seen preoperatively by anesthesiologist or proxy prior to day of surgery? YES  Pt abstained from smoking 24 hours prior to anesthesia?  N/A           Physical Exam    Airway  Mallampati: III  TM Distance: 4 - 6 cm  Neck ROM: decreased range of motion   Mouth opening: Diminished (comment)     Cardiovascular  Regular rate and rhythm,  S1 and S2 normal,  no murmur, click, rub, or gallop             Dental    Dentition: Poor dentition     Pulmonary  Breath sounds clear to auscultation               Abdominal  GI exam deferred       Other Findings            Anesthetic Plan    ASA: 3  Anesthesia type: MAC and general            Anesthetic plan and risks discussed with: Patient and Family 95.3

## 2023-07-25 ENCOUNTER — TRANSCRIPTION ENCOUNTER (OUTPATIENT)
Age: 83
End: 2023-07-25

## 2023-07-25 VITALS
OXYGEN SATURATION: 95 % | TEMPERATURE: 96 F | DIASTOLIC BLOOD PRESSURE: 60 MMHG | SYSTOLIC BLOOD PRESSURE: 134 MMHG | RESPIRATION RATE: 16 BRPM | HEART RATE: 55 BPM

## 2023-07-25 LAB
ANION GAP SERPL CALC-SCNC: 9 MMOL/L — SIGNIFICANT CHANGE UP (ref 7–14)
BUN SERPL-MCNC: 24 MG/DL — HIGH (ref 10–20)
CALCIUM SERPL-MCNC: 9 MG/DL — SIGNIFICANT CHANGE UP (ref 8.4–10.5)
CHLORIDE SERPL-SCNC: 102 MMOL/L — SIGNIFICANT CHANGE UP (ref 98–110)
CO2 SERPL-SCNC: 23 MMOL/L — SIGNIFICANT CHANGE UP (ref 17–32)
CREAT SERPL-MCNC: 0.8 MG/DL — SIGNIFICANT CHANGE UP (ref 0.7–1.5)
EGFR: 74 ML/MIN/1.73M2 — SIGNIFICANT CHANGE UP
GLUCOSE SERPL-MCNC: 139 MG/DL — HIGH (ref 70–99)
HCT VFR BLD CALC: 37.4 % — SIGNIFICANT CHANGE UP (ref 37–47)
HGB BLD-MCNC: 12.4 G/DL — SIGNIFICANT CHANGE UP (ref 12–16)
MCHC RBC-ENTMCNC: 29.6 PG — SIGNIFICANT CHANGE UP (ref 27–31)
MCHC RBC-ENTMCNC: 33.2 G/DL — SIGNIFICANT CHANGE UP (ref 32–37)
MCV RBC AUTO: 89.3 FL — SIGNIFICANT CHANGE UP (ref 81–99)
NRBC # BLD: 0 /100 WBCS — SIGNIFICANT CHANGE UP (ref 0–0)
PLATELET # BLD AUTO: 193 K/UL — SIGNIFICANT CHANGE UP (ref 130–400)
PMV BLD: 10.8 FL — HIGH (ref 7.4–10.4)
POTASSIUM SERPL-MCNC: 4.5 MMOL/L — SIGNIFICANT CHANGE UP (ref 3.5–5)
POTASSIUM SERPL-SCNC: 4.5 MMOL/L — SIGNIFICANT CHANGE UP (ref 3.5–5)
RBC # BLD: 4.19 M/UL — LOW (ref 4.2–5.4)
RBC # FLD: 13.2 % — SIGNIFICANT CHANGE UP (ref 11.5–14.5)
SODIUM SERPL-SCNC: 134 MMOL/L — LOW (ref 135–146)
WBC # BLD: 16.91 K/UL — HIGH (ref 4.8–10.8)
WBC # FLD AUTO: 16.91 K/UL — HIGH (ref 4.8–10.8)

## 2023-07-25 PROCEDURE — 99221 1ST HOSP IP/OBS SF/LOW 40: CPT

## 2023-07-25 RX ORDER — TRAMADOL HYDROCHLORIDE 50 MG/1
1 TABLET ORAL
Qty: 24 | Refills: 0
Start: 2023-07-25 | End: 2023-07-28

## 2023-07-25 RX ORDER — ASPIRIN/CALCIUM CARB/MAGNESIUM 324 MG
1 TABLET ORAL
Qty: 60 | Refills: 0
Start: 2023-07-25 | End: 2023-08-23

## 2023-07-25 RX ORDER — ASPIRIN/CALCIUM CARB/MAGNESIUM 324 MG
1 TABLET ORAL
Qty: 0 | Refills: 0 | DISCHARGE

## 2023-07-25 RX ORDER — NALOXONE HYDROCHLORIDE 4 MG/.1ML
4 SPRAY NASAL
Qty: 1 | Refills: 0
Start: 2023-07-25

## 2023-07-25 RX ORDER — SENNA PLUS 8.6 MG/1
2 TABLET ORAL
Qty: 28 | Refills: 0
Start: 2023-07-25 | End: 2023-08-07

## 2023-07-25 RX ORDER — ACETAMINOPHEN 500 MG
2 TABLET ORAL
Qty: 112 | Refills: 0
Start: 2023-07-25 | End: 2023-08-07

## 2023-07-25 RX ORDER — PANTOPRAZOLE SODIUM 20 MG/1
1 TABLET, DELAYED RELEASE ORAL
Qty: 30 | Refills: 0
Start: 2023-07-25 | End: 2023-08-23

## 2023-07-25 RX ORDER — OXYCODONE HYDROCHLORIDE 5 MG/1
1 TABLET ORAL
Qty: 15 | Refills: 0
Start: 2023-07-25 | End: 2023-07-29

## 2023-07-25 RX ADMIN — Medication 15 MILLIGRAM(S): at 06:48

## 2023-07-25 RX ADMIN — Medication 2 MILLIGRAM(S): at 11:12

## 2023-07-25 RX ADMIN — Medication 15 MILLIGRAM(S): at 10:39

## 2023-07-25 RX ADMIN — Medication 650 MILLIGRAM(S): at 11:44

## 2023-07-25 RX ADMIN — Medication 650 MILLIGRAM(S): at 06:22

## 2023-07-25 RX ADMIN — Medication 5 MILLIGRAM(S): at 08:40

## 2023-07-25 RX ADMIN — Medication 81 MILLIGRAM(S): at 05:57

## 2023-07-25 RX ADMIN — Medication 100 MILLIGRAM(S): at 03:28

## 2023-07-25 RX ADMIN — Medication 650 MILLIGRAM(S): at 01:02

## 2023-07-25 RX ADMIN — Medication 650 MILLIGRAM(S): at 11:14

## 2023-07-25 RX ADMIN — Medication 15 MILLIGRAM(S): at 03:29

## 2023-07-25 RX ADMIN — Medication 650 MILLIGRAM(S): at 05:57

## 2023-07-25 RX ADMIN — Medication 15 MILLIGRAM(S): at 10:54

## 2023-07-25 NOTE — OCCUPATIONAL THERAPY INITIAL EVALUATION ADULT - TRANSFER SAFETY CONCERNS NOTED: TUB, REHAB EVAL
As discussed with pt, pt to have family member present during tub transfer to increase safety. Pt demonstrated good understanding and in agreement./decreased weight-shifting ability

## 2023-07-25 NOTE — CONSULT NOTE ADULT - ASSESSMENT
MEDICATIONS  (STANDING):  acetaminophen     Tablet .. 650 milliGRAM(s) Oral every 6 hours  aspirin enteric coated 81 milliGRAM(s) Oral every 12 hours  chlorhexidine 2% Cloths 1 Application(s) Topical <User Schedule>  enalapril 5 milliGRAM(s) Oral two times a day  polyethylene glycol 3350 17 Gram(s) Oral at bedtime  senna 2 Tablet(s) Oral at bedtime  sodium chloride 0.9%. 1000 milliLiter(s) (75 mL/Hr) IV Continuous <Continuous>    MEDICATIONS  (PRN):  magnesium hydroxide Suspension 30 milliLiter(s) Oral daily PRN Constipation  ondansetron Injectable 4 milliGRAM(s) IV Push every 6 hours PRN Nausea and/or Vomiting  oxyCODONE    IR 5 milliGRAM(s) Oral every 8 hours PRN breakthrough pain  traMADol 50 milliGRAM(s) Oral every 4 hours PRN Severe Pain (7 - 10)      ASSESSMENT AND PLAN   PRINCIPAL PROBLEM    -    ACTIVE PROBLEMS    -     CHRONIC PROBLEMS  PAST MEDICAL & SURGICAL HISTORY:  Afib  AGE 72 X1 EPISODE      Early stage skin cancer  SQAMOUS AND BASAL CELL SKIN CA      Asymptomatic hypertension      Peripheral neuropathy      Mitral regurgitation      OA (osteoarthritis)      Low back pain  BULGING DISCS LUMBAR SPINE      Murmur, heart      Obesity with body mass index (BMI) of 30.0 to 39.9      History of medical problems  hiatal hernia, tia no residual      History of cholecystectomy      H/O abdominal hysterectomy      History of surgery  REMOVAL OF RIGHT ANKLE SKIN CANCER. right knee miniscus repair      History of tonsillectomy          DVT/GI px    FULL CODE      MEDICATIONS  (STANDING):  acetaminophen     Tablet .. 650 milliGRAM(s) Oral every 6 hours  aspirin enteric coated 81 milliGRAM(s) Oral every 12 hours  chlorhexidine 2% Cloths 1 Application(s) Topical <User Schedule>  enalapril 5 milliGRAM(s) Oral two times a day  polyethylene glycol 3350 17 Gram(s) Oral at bedtime  senna 2 Tablet(s) Oral at bedtime  sodium chloride 0.9%. 1000 milliLiter(s) (75 mL/Hr) IV Continuous <Continuous>    MEDICATIONS  (PRN):  magnesium hydroxide Suspension 30 milliLiter(s) Oral daily PRN Constipation  ondansetron Injectable 4 milliGRAM(s) IV Push every 6 hours PRN Nausea and/or Vomiting  oxyCODONE    IR 5 milliGRAM(s) Oral every 8 hours PRN breakthrough pain  traMADol 50 milliGRAM(s) Oral every 4 hours PRN Severe Pain (7 - 10)    ASSESSMENT AND PLAN   PRINCIPAL PROBLEM  Leukocytosis, reactionary from surgery   - no signs of infection   - hold off on abx.     ACTIVE PROBLEMS  End stage R knee OA s/p R TKR  - asa bid for dvt px for at least 14 days post op  - Bowel regimen, cont PT, prn pain control, ICE.     Obesity BMI 34   - wt loss counselling per PCP.     CHRONIC PROBLEMS    Early stage skin cancer, SQAMOUS AND BASAL CELL SKIN CA- in remission.     HTN  -cont enalapril     Peripheral neuropathy  - prn pain meds     Mitral regurgitation  - cont enalapril     Low back pain/ BULGING DISCS LUMBAR SPINE  - prn pain meds     DVT/GI px  - asa bid for at least 14 days post op / on a diet.      FULL CODE     Will sign off.  Pls call prn.  Thank you for allowing me to participate in the care of this patient.

## 2023-07-25 NOTE — CONSULT NOTE ADULT - SUBJECTIVE AND OBJECTIVE BOX
BJ FINN  83y  Female      Patient is a 83y old  Female who presents with a chief complaint of Total Knee Arthroplasty  (24 Jul 2023 13:36)       HPI:   BJ FINN83y     REVIEW OF SYSTEMS:  CONSTITUTIONAL: No fever, weight loss, or fatigue  EYES: No eye pain, visual disturbances, or discharge  ENT:  No difficulty hearing, tinnitus, vertigo; No sinus or throat pain  NECK: No pain or stiffness  BREASTS: No pain, masses, or nipple discharge  RESPIRATORY: No cough, wheezing, chills or hemoptysis; No shortness of breath  CARDIOVASCULAR: No chest pain, palpitations, dizziness, or leg swelling  GASTROINTESTINAL: No abdominal or epigastric pain. No nausea, vomiting, or hematemesis; No diarrhea or constipation. No melena or hematochezia.  GENITOURINARY: No dysuria, frequency, hematuria, or incontinence  NEUROLOGICAL: No headaches, memory loss, loss of strength, numbness, or tremors  SKIN: No itching, burning, rashes, or lesions   LYMPH NODES: No enlarged glands  ENDOCRINE: No heat or cold intolerance; No hair loss  MUSCULOSKELETAL: No joint pain or swelling; No muscle, back, or extremity pain    PMHx/PSHx  PAST MEDICAL & SURGICAL HISTORY:  Afib  AGE 72 X1 EPISODE      Early stage skin cancer  SQAMOUS AND BASAL CELL SKIN CA      Asymptomatic hypertension      Peripheral neuropathy      Mitral regurgitation      OA (osteoarthritis)      Low back pain  BULGING DISCS LUMBAR SPINE      Murmur, heart      Obesity with body mass index (BMI) of 30.0 to 39.9      History of medical problems  hiatal hernia, tia no residual      History of cholecystectomy      H/O abdominal hysterectomy      History of surgery  REMOVAL OF RIGHT ANKLE SKIN CANCER. right knee miniscus repair      History of tonsillectomy          Medication  Home Medications:  enalapril 5 mg oral tablet: 1 tab(s) orally 2 times a day (24 Jul 2023 08:20)  naproxen 500 mg oral tablet: 1 tab(s) orally 2 times a day (24 Jul 2023 08:20)      FAMILY HISTORY    M:     F:     SOCIAL HISTORY   Cigarette:  Etoh:  Drugs:     ALLERGIES   adhesives (Rash)  latex (Rash)  sulfa drugs (Rash)  sulfADIAZINE (Rash)      PHYSICAL EXAM:  Vital Signs Last 24 Hrs  T(C): 35.7 (25 Jul 2023 12:13), Max: 36.1 (24 Jul 2023 20:00)  T(F): 96.2 (25 Jul 2023 12:13), Max: 97 (24 Jul 2023 20:00)  HR: 55 (25 Jul 2023 12:13) (55 - 77)  BP: 134/60 (25 Jul 2023 12:13) (102/50 - 137/63)  BP(mean): 68 (24 Jul 2023 21:12) (68 - 68)  RR: 16 (25 Jul 2023 12:13) (16 - 18)  SpO2: 95% (25 Jul 2023 12:13) (91% - 95%)    Parameters below as of 25 Jul 2023 12:13  Patient On (Oxygen Delivery Method): room air        GENERAL: NAD, AAOX3   HEAD:  Atraumatic, Normocephalic  EYES: EOMI, PERRLA, no pallor   ENT: norml oropharynx   NECK: Supple, No JVD, Normal thyroid  RESP: Clear to percussion bilaterally; No rales, rhonchi, wheezing, or rubs  HEART: S1 S2 Regular rate and rhythm; No murmurs, rubs, or gallops  ABDOMEN: Soft, Nontender, Nondistended; Bowel sounds present  EXTREMITIES: No pedal edema, no cyanosis   : no lópez, no CVA tenderness   NERVOUS SYSTEM:  Alert & Oriented X3, Motor Strength BUE , BLE, sensation intact      Consultant(s) Notes Reviewed:  [x ] YES  [ ] NO  Care Discussed with Consultants/Other Providers [ x] YES  [ ] NO    LABS:                        12.4   16.91 )-----------( 193      ( 25 Jul 2023 06:56 )             37.4     07-25    134<L>  |  102  |  24<H>  ----------------------------<  139<H>  4.5   |  23  |  0.8    Ca    9.0      25 Jul 2023 06:56              Urinalysis Basic - ( 25 Jul 2023 06:56 )    Color: x / Appearance: x / SG: x / pH: x  Gluc: 139 mg/dL / Ketone: x  / Bili: x / Urobili: x   Blood: x / Protein: x / Nitrite: x   Leuk Esterase: x / RBC: x / WBC x   Sq Epi: x / Non Sq Epi: x / Bacteria: x        RADIOLOGY & ADDITIONAL TESTS:      Imaging Personally Reviewed:  [ ] YES  [ ] NO           BJ FINN  83y  Female      Patient is a 83y old  Female who presents with a chief complaint of Total Knee Arthroplasty  (24 Jul 2023 13:36)       HPI:   BJ FINN 83y with a past medical history Scca of R ankle, HTN, Bulging lumbar disc, MV regurgitation, neuropathy, with end stage R knee pain, who presented   for elective R total knee replacement.  Patient reports intermittent R knee pain, 4/10, NR, aggravated by exertion, alleviated by rest, pain meds, and ice.   Patient denies SOB, or cough or   fever.  Denies BM, passing gas.  No abd pain.     REVIEW OF SYSTEMS:  CONSTITUTIONAL: No fever, weight loss, or fatigue  EYES: No eye pain, visual disturbances, or discharge  ENT:  No difficulty hearing, tinnitus, vertigo; No sinus or throat pain  NECK: No pain or stiffness  BREASTS: No pain, masses, or nipple discharge  RESPIRATORY: No cough, wheezing, chills or hemoptysis; No shortness of breath  CARDIOVASCULAR: No chest pain, palpitations, dizziness, or leg swelling  GASTROINTESTINAL: No abdominal or epigastric pain. No nausea, vomiting, or hematemesis; No diarrhea or constipation. No melena or hematochezia.  GENITOURINARY: No dysuria, frequency, hematuria, or incontinence  NEUROLOGICAL: No headaches, memory loss, loss of strength, numbness, or tremors  SKIN: No itching, burning, rashes, or lesions   LYMPH NODES: No enlarged glands  ENDOCRINE: No heat or cold intolerance; No hair loss  MUSCULOSKELETAL:  intermittent R knee pain.     PMHx/PSHx  PAST MEDICAL & SURGICAL HISTORY:  Afib  AGE 72 X1 EPISODE  Early stage skin cancer  SQAMOUS AND BASAL CELL SKIN CA  Asymptomatic hypertension  Peripheral neuropathy  Mitral regurgitation  OA (osteoarthritis)  Low back pain  BULGING DISCS LUMBAR SPINE  Murmur, heart  Obesity with body mass index (BMI) of 30.0 to 39.9    History of medical problems  hiatal hernia, tia no residual      History of cholecystectomy  H/O abdominal hysterectomy  History of surgery  REMOVAL OF RIGHT ANKLE SKIN CANCER. right knee miniscus repair  history of tonsillectomy          Medication  Home Medications:  enalapril 5 mg oral tablet: 1 tab(s) orally 2 times a day (24 Jul 2023 08:20)  naproxen 500 mg oral tablet: 1 tab(s) orally 2 times a day (24 Jul 2023 08:20)      FAMILY HISTORY    M: KIDNEY and lung cancer     F: kidney disease.     SOCIAL HISTORY   Cigarette: never smoker  Etoh: occasional   Drugs: denies     ALLERGIES   adhesives (Rash)  latex (Rash)  sulfa drugs (Rash)  sulfADIAZINE (Rash)      PHYSICAL EXAM:  Vital Signs Last 24 Hrs  T(C): 35.7 (25 Jul 2023 12:13), Max: 36.1 (24 Jul 2023 20:00)  T(F): 96.2 (25 Jul 2023 12:13), Max: 97 (24 Jul 2023 20:00)  HR: 55 (25 Jul 2023 12:13) (55 - 77)  BP: 134/60 (25 Jul 2023 12:13) (102/50 - 137/63)  BP(mean): 68 (24 Jul 2023 21:12) (68 - 68)  RR: 16 (25 Jul 2023 12:13) (16 - 18)  SpO2: 95% (25 Jul 2023 12:13) (91% - 95%)    Parameters below as of 25 Jul 2023 12:13  Patient On (Oxygen Delivery Method): room air        GENERAL: NAD, AAOX3   HEAD:  Atraumatic, Normocephalic  EYES: EOMI, PERRLA, no pallor   ENT: norml oropharynx   NECK: Supple, No JVD, Normal thyroid  RESP: Clear to percussion bilaterally; No rales, rhonchi, wheezing, or rubs  HEART: S1 S2 Regular rate and rhythm; No murmurs, rubs, or gallops  ABDOMEN: Soft, Nontender, Nondistended; Bowel sounds present  EXTREMITIES: +1 RLE knee edema, no cyanosis   NERVOUS SYSTEM:  Alert & Oriented X3, Motor Strength 5/5 all extremities, sensation intact      Consultant(s) Notes Reviewed:  [x ] YES  [ ] NO  Care Discussed with Consultants/Other Providers [ x] YES  [ ] NO    LABS:                        12.4   16.91 )-----------( 193      ( 25 Jul 2023 06:56 )             37.4     07-25    134<L>  |  102  |  24<H>  ----------------------------<  139<H>  4.5   |  23  |  0.8    Ca    9.0      25 Jul 2023 06:56              Urinalysis Basic - ( 25 Jul 2023 06:56 )    Color: x / Appearance: x / SG: x / pH: x  Gluc: 139 mg/dL / Ketone: x  / Bili: x / Urobili: x   Blood: x / Protein: x / Nitrite: x   Leuk Esterase: x / RBC: x / WBC x   Sq Epi: x / Non Sq Epi: x / Bacteria: x        RADIOLOGY & ADDITIONAL TESTS:      Imaging Personally Reviewed:  [ ] YES  [ ] NO

## 2023-07-25 NOTE — OCCUPATIONAL THERAPY INITIAL EVALUATION ADULT - ADDITIONAL COMMENTS
PLOF obtained from pt. Pt reported that she owns rollator, sc, reacher, leg , long handled sponge, long handled shoehorn, leg , 3:1 commode, grab bars.   (+) driving

## 2023-07-25 NOTE — OCCUPATIONAL THERAPY INITIAL EVALUATION ADULT - LIVES WITH, PROFILE
in an private house enters through the garage 0 steps to enter +3+4+4 steps inside with bilateral handrails +tub with grab bars and tub safety rail +bench +high toilet with vanity within reach; pt's son will be staying with her upon d/c/alone

## 2023-07-25 NOTE — DISCHARGE NOTE NURSING/CASE MANAGEMENT/SOCIAL WORK - NSCORESITESY/N_GEN_A_CORE_RD
[facility]  Crisis/Safety Plan    Name:  Kaylie Burciaga  MRN:  [id number]  Date:  2018    Warning signs that a crisis may be developing for me or I may be at risk:  1) Using drugs and alcohol  2) No control over myself  3) Don't know where my daughter is    Coping strategies I can use on my own (relaxation, physical activity, etc):  1) Exercise  2) Eat well  3) Get treatment for substance use disorder    Ways I can make my environment safe:  1) No drugs or alcohol  2) Keep my daughter safe  3) Go to drug treatment    Things I want to tell myself when I feel a crisis developin) I'll get through this  2) My daughter needs me  3) I can get sober    People I can contact for support or distraction (and their phone numbers):  1) My mom  2) other family  3) friends    If I’m not able to reach my support people, or the above strategies don’t help, I can contact the following professionals, agencies, or hotlines:  1) Crisis Call Center ():  [phone] -OR- [phone]  2) Crisis Text Line ():  Text START to 290950  3) [facility] [phone] M-F 9-6  4) [employer]     Nancy Glover R.N.       No No

## 2023-07-25 NOTE — PHYSICAL THERAPY INITIAL EVALUATION ADULT - ADDITIONAL COMMENTS
pt lives alone in a private house with entrance through the garage without stairs, then 3+4+4 steps to the bedroom and bathroom area.  Pt amb with SC or rollator.  Pt has two rollator at home one upstairs and other downstairs.  Pt is an active  prior to admission

## 2023-07-25 NOTE — DISCHARGE NOTE NURSING/CASE MANAGEMENT/SOCIAL WORK - PATIENT PORTAL LINK FT
You can access the FollowMyHealth Patient Portal offered by [facility] by registering at the following website: [email or web address]. By joining [employer]’s FollowMyHealth portal, you will also be able to view your health information using other applications (apps) compatible with our system.

## 2023-07-25 NOTE — OCCUPATIONAL THERAPY INITIAL EVALUATION ADULT - NSOTDMEREC_GEN_A_CORE
Pt reported that she owns rollator, sc, reacher, leg , long handled sponge, long handled shoehorn, leg , 3:1 commode./dressing

## 2023-07-25 NOTE — OCCUPATIONAL THERAPY INITIAL EVALUATION ADULT - GENERAL OBSERVATIONS, REHAB EVAL
9:30-10:10; chart reviewed, ok to treat by Occupational Therapist as confirmed by TK Ames, Pt received seated in bedside chair +aquacel right knee +JESSIE herndon in NAD. Pt reported 8/10 right knee pain; RN aware. Pt in agreement with OT IE.

## 2023-07-28 LAB — SURGICAL PATHOLOGY STUDY: SIGNIFICANT CHANGE UP

## 2023-08-01 DIAGNOSIS — G62.9 POLYNEUROPATHY, UNSPECIFIED: ICD-10-CM

## 2023-08-01 DIAGNOSIS — R01.1 CARDIAC MURMUR, UNSPECIFIED: ICD-10-CM

## 2023-08-01 DIAGNOSIS — M51.36 OTHER INTERVERTEBRAL DISC DEGENERATION, LUMBAR REGION: ICD-10-CM

## 2023-08-01 DIAGNOSIS — M17.11 UNILATERAL PRIMARY OSTEOARTHRITIS, RIGHT KNEE: ICD-10-CM

## 2023-08-01 DIAGNOSIS — M54.50 LOW BACK PAIN, UNSPECIFIED: ICD-10-CM

## 2023-08-01 DIAGNOSIS — I48.91 UNSPECIFIED ATRIAL FIBRILLATION: ICD-10-CM

## 2023-08-01 DIAGNOSIS — I34.0 NONRHEUMATIC MITRAL (VALVE) INSUFFICIENCY: ICD-10-CM

## 2023-08-01 DIAGNOSIS — I10 ESSENTIAL (PRIMARY) HYPERTENSION: ICD-10-CM

## 2023-08-01 DIAGNOSIS — C44.90 UNSPECIFIED MALIGNANT NEOPLASM OF SKIN, UNSPECIFIED: ICD-10-CM

## 2023-08-18 ENCOUNTER — RESULT CHARGE (OUTPATIENT)
Age: 83
End: 2023-08-18

## 2023-08-18 ENCOUNTER — APPOINTMENT (OUTPATIENT)
Dept: ORTHOPEDIC SURGERY | Facility: CLINIC | Age: 83
End: 2023-08-18
Payer: MEDICARE

## 2023-08-18 PROBLEM — Z87.898 PERSONAL HISTORY OF OTHER SPECIFIED CONDITIONS: Chronic | Status: ACTIVE | Noted: 2023-07-24

## 2023-08-18 PROBLEM — E66.9 OBESITY, UNSPECIFIED: Chronic | Status: ACTIVE | Noted: 2023-07-19

## 2023-08-18 PROCEDURE — 73560 X-RAY EXAM OF KNEE 1 OR 2: CPT | Mod: 50

## 2023-08-18 PROCEDURE — 99024 POSTOP FOLLOW-UP VISIT: CPT

## 2023-08-18 NOTE — HISTORY OF PRESENT ILLNESS
[de-identified] : Patient is a 83-year-old female accompanied by  status post right total knee replacement 7/24/2023 by Dr. Teixeira.  Patient is doing well, no complaints.  Denies pain, denies numbness/tingling, denies instability.

## 2023-08-18 NOTE — DATA REVIEWED
[FreeTextEntry1] : X-ray bilateral knees for comparison: No acute fractures, subluxations, dislocations.  Right total knee replacement intact in good position.

## 2023-08-18 NOTE — PHYSICAL EXAM
[Right] : right knee [] : light touch is intact throughout [FreeTextEntry3] : Surgical site healing well, no sign of infection.  Staples removed, Steri-Strips applied [de-identified] : Good strength [TWNoteComboBox7] : flexion 100 degrees [de-identified] : extension 0 degrees

## 2023-08-18 NOTE — DISCUSSION/SUMMARY
[de-identified] : Patient is doing well at this point.  Advised patient to continue aspirin for DVT prophylaxis.  Pain is controlled.  Patient will start outpatient physical therapy.  Follow-up in 6 weeks for reevaluation.  Call if any questions or concerns.

## 2023-08-24 NOTE — DISCHARGE NOTE NURSING/CASE MANAGEMENT/SOCIAL WORK - NSPROMEDSBROUGHTTOHOSP_GEN_A_NUR
[facility]  Hospital Medicine Admission Note   Patient: Alexandria Carter  Service Date: 8/23/2023    YOB: 1989  Admission Date: 8/23/2023    MRN: [id number]  Outpatient PCP: David Donohue MD   Room: 02/02  Admitting Physician: Eren Tay MD     Chief Complaint   Patient presents with   • Nausea       ASSESSMENT AND PLAN   Alexandria Carter  is a 34 year old female who admitted to the hospital for: -     Symptomatic cholelithiasis [K80.20]    Detail assessment and plan is as follow:       Symptomatic cholelithiasis  Nausea, vomiting and right upper quadrant abdominal pain and epigastric pain secondary   Likely combination of GERD and cholelithiasis  History of refractory GERD status post NISSEN fundoplication 2016    Second ED visit for similar issues  Right upper quadrant ultrasound showed cholelithiasis and gallbladder sludge, no evidence of acute cholecystitis  No fever, no leukocytosis suggest infection  Liver function within normal limits    Plan   Admit to observation unit  Supportive care with IV fluid, antiemetic, analgesics, PPI  General surgery consulted, recommend NPO after midnight, possible cholecystectomy tomorrow or as outpatient       Acute colitis  CT shows wall thickening in the hepatic flexure and descending colon  Patient reports few episodes of watery diarrhea associated with vomiting  History of irritable bowel syndrome  Continue supportive care  Clear liquid diet tonight and monitor  Check inflammatory markers ESR, CRP  Monitor stool output      Acute dehydration secondary to GI fluid loss  Hypokalemia secondary to above serum potassium level 2.9  Received 20 mEq potassium at the ED  Continue supportive care with IV fluid, antiemetic, analgesics    Elevated blood pressure, likely related with pain, anxiety  No history of hypertension  Denies use of marijuana or other stimulants  1 time dose of lorazepam ordered  Monitor blood pressure  P.r.n. hydralazine for severe  blood pressure SBP > 180    Admission status:  Observation, observation unit  DVT Prophylaxis: SCD and enoxaparin  Code status:    Code Status: Prior  full code  Diet:    clear liquid diet, NPO after midnight    Discussion: The above assessment and plan discussed with the patient and/or patient's family at bedside. All of their questions were answered.    YVES Carter  is a 34 year old female with a PMH significant but not limited to  history of Nissen fundoplication, Irritable Bowel Syndrome, and Gastroesophageal Reflux Disease presents with complaints of epigastric abdominal pain, vomiting, and diarrhea.    She  was previously evaluated at [facility] on 8/21 for similar complaints. At that time, her lab work was reassuring, and she was discharged with a prescription for Zofran (ondansetron). The patient states that the symptoms persisted, leading her to present to the Emergency Department today.    She reports constant epigastric abdominal pain, associated with frequent nausea and vomiting, denies any bleeding in the vomitus.  She also reports watery diarrheas,  Denies any fever, chills, chest pain, shortness a breath, lower extremity edema    The emergency department vital sign within normal limits,  Labs pertinent for hypokalemia otherwise no leukocytosis, has normal renal function.  UA is consistent with evidence of dehydration with presence of ketone.  LFTs are within normal limits    CT scan: Shows possible nonspecific colitis, biliary sludge and gallbladder calcification, and a midline fat-containing ventral hernia above the umbilicus.  Right upper quadrant ultrasound showed similar finding without evidence of cholecystitis.     Patient treated with IV fluid, analgesics, general surgery consulted, admitted to the hospitalist service for observation and possible laparoscopy cholecystectomy.        Past Medical / Family / Surgical / Social History     ALLERGIES:   Allergen Reactions    • Sugammadex Muscle Spasm     Severe masseter spasm  bronchospasm     Past Medical History    RAD (reactive airway disease)                                 Anxiety                                                       Gastroesophageal reflux disease                               IBS (irritable bowel syndrome)                                Constipation                                                  Gallstone                                       12-      Comment: noted per Ultrasound    Obesity                                                       Miscarriage within last 12 months               09/2016       Hiatal hernia                                          Allergy                                                       Social History     Tobacco Use   • Smoking status: Never   • Smokeless tobacco: Never   Substance Use Topics   • Alcohol use: Yes     Alcohol/week: 0.0 standard drinks of alcohol     Comment: maybe 2 drinks/month   • Drug use: No     Social History     Social History Narrative   • Not on file    Past Surgical History    ESOPHAGOGASTRODUODENOSCOPY TRANSORAL FLEX W/BX* 03-    NISSEN FUNDOPLICATION                           08/11/2016      Comment: laparoscopic NISSEN fundoplication for                refractory GERD    REPAIR PARAESOPHAGEAL HERNIA                    11/23/2016      Comment: laparoscopic with take down Nissen                fundoplication    Family History   Problem Relation Age of Onset   • Cancer Paternal Grandmother         breast    • Cancer Maternal Grandmother           Home Medications     No outpatient medications have been marked as taking for the 8/23/23 encounter (Hospital Encounter).         Review of System   12 points review of systems performed, pertinent positives and negatives addressed in HPI above     Physical examination     Visit Vitals  BP (!) 179/98 (BP Location: LUE - Left upper extremity, Patient Position: Semi-Haas's)   Pulse 83    Temp 98.6 °F (37 °C) (Oral)   Resp 16   Ht 5' 7\" (1.702 m)   Wt 103.8 kg (228 lb 13.4 oz)   LMP 08/16/2023   SpO2 100%   BMI 35.84 kg/m²       Weight    08/23/23 1114   Weight: 103.8 kg (228 lb 13.4 oz)       Body mass index is 35.84 kg/m².    Constitutional:  Well developed, well nourished, no acute distress, non-toxic appearance.  Eyes:  Pupils equal, round, reactive to light. Conjunctivae normal.  HEENT:  Atraumatic. Oropharynx moist, no pharyngeal exudates. Neck- normal range of motion, no tenderness.  Respiratory:  No respiratory distress, normal breath sounds, no rales, no wheezing.  Cardiovascular:  Normal rate, normal rhythm. No murmurs, no gallops, no rubs.  no pedal  edema  Gastrointestinal:   epigastric tenderness, right upper quadrant tenderness on deep palpation, negative Harper sign,  Abdomin is soft, NT/ND, +BS, No organomegaly, no mass, no rebound, no guarding. No signs of Ascites   Genitourinary:  No CVAT or suprapubic tenderness  Musculoskeletal:  No edema, no tenderness, no deformities. Back- no tenderness.  Integument:  Well hydrated, no rash, no ulcers  Lymphatic:  No lymphadenopathy noted.  Neurologic:  Alert and oriented x 3. Cranial nerves 2-12 normal. Normal motor function. Normal sensory function. No focal deficits noted.  Psychiatric:  Speech and behavior appropriate.    Laboratory values     Laboratory values:   Recent Labs   Lab 08/23/23  1320   WBC 8.7   HGB 13.6   HCT 40.1          Recent Labs   Lab 08/23/23  1327 08/23/23  1320   SODIUM  --  139   POTASSIUM  --  2.9*   CHLORIDE  --  100   CO2  --  23   CALCIUM  --  9.9   GLUCOSE  --  101*   BUN  --  6   CREATININE 0.60 0.67   MG  --  1.8        Recent Labs   Lab 08/23/23  1320   ALBUMIN 4.1   AST 11   GPT 21   BILIRUBIN 0.6     No results found  No results available in last 24 hours      No results found    No results found for: \"VB12\", \"JOEL\", \"VITD25\", \"TSH\", \"HGBA1C\"     No results found for: \"CHOLESTEROL\", \"TRIGLYCERDES\",  \"HDL\", \"CALCLDL\"     Lab Results   Component Value Date    USPG >1.030 (H) 10/03/2016    UPROT NEGATIVE 10/03/2016    UWBC TRACE (A) 10/03/2016    URBC TRACE (A) 10/03/2016    UNITR NEGATIVE 10/03/2016    UPH 5.5 10/03/2016       No results found          No results found      ED Course as of 08/23/23 2121   Wed Aug 23, 2023   1609 US LIVER GALLBLADDER PANCREAS (RUQ)  Per radiology: Cholelithiasis and gallbladder sludge are seen. Hepatic steatosis. [AW]   1706 On reassessment the patient reports feeling better than when she came in but she is still scared to eat because of the pain. We will attempt to PO challenge. If she is able to eat she can likely be discharged with medication and outpatient surgical follow up. [AW]   1808 On reassessment the patient reports unbearable pain and nausea after attempting to eat applesauce and crackers. We will speak with the surgeon about her symptomatic cholelithiasis. [AW]   1813 I spoke with Dr. Hernadez from surgery about the patient's persistent symptoms. He recommended admission with NPO at midnight and they will evaluate tomorrow. [AW]   1847 Patient ordered for additional morphine and IM Tigan for nausea. [AW]   1910 I spoke with the hospitalist who agreed with admission and had no questions. [AW]      ED Course User Index  [AW] Alessandra Lovell MD       Imaging      Radiology: Imaging studies have been reviewed and pertinent findings discussed in the Assessment and Plan.    Results for orders placed or performed during the hospital encounter of 08/23/23 (from the past 72 hour(s))   CT ABDOMEN PELVIS W CONTRAST - IV contrast only    Impression    IMPRESSION: Wall thickening in the hepatic flexure and descending colon.  This appearance can be seen with a nonspecific colitis that may be  infectious or inflammatory. This appearance may also be related to  incomplete colonic distention due to lack of administration of oral  contrast. Clinical correlation is  recommended.    Increased density in the gallbladder which may be related to biliary sludge  or recent contrast administration. Calcification in the gallbladder may be  related to gallbladder wall calcification or gallstones.    Surgical change related to Nissen fundoplication.    Midline fat-containing ventral hernia above the level of the umbilicus.  Fat-containing ventral hernia.    Electronically Signed by: Pepito Mancilla MD   Signed on: 8/23/2023 2:27 PM   Workstation ID: [id number]    LIVER GALLBLADDER PANCREAS (RUQ)    Impression    IMPRESSION: Cholelithiasis and gallbladder sludge are seen. Hepatic  steatosis.    Electronically Signed by: Omer Mendoza MD   Signed on: 8/23/2023 3:58 PM   Workstation ID: [id number]       No results found for: \"EF\"      Disposition     The patient is not expected to require at least a two midnight stay in the hospital.    Goals of Care:  Patient is decisional: yes  Patient has POA documents in the chart: Yes  Code Status: Full Code      Signed   Eren Tay MD   Internal Medicine Hospitalist  8/23/2023 9:21 PM  Contact: Epic Secure chat or page me [phone]       no

## 2023-10-03 ENCOUNTER — APPOINTMENT (OUTPATIENT)
Dept: ORTHOPEDIC SURGERY | Facility: CLINIC | Age: 83
End: 2023-10-03
Payer: MEDICARE

## 2023-10-03 PROCEDURE — 99024 POSTOP FOLLOW-UP VISIT: CPT

## 2023-11-03 ENCOUNTER — OUTPATIENT (OUTPATIENT)
Dept: OUTPATIENT SERVICES | Facility: HOSPITAL | Age: 83
LOS: 1 days | End: 2023-11-03
Payer: MEDICARE

## 2023-11-03 DIAGNOSIS — Z90.49 ACQUIRED ABSENCE OF OTHER SPECIFIED PARTS OF DIGESTIVE TRACT: Chronic | ICD-10-CM

## 2023-11-03 DIAGNOSIS — Z12.31 ENCOUNTER FOR SCREENING MAMMOGRAM FOR MALIGNANT NEOPLASM OF BREAST: ICD-10-CM

## 2023-11-03 DIAGNOSIS — Z98.890 OTHER SPECIFIED POSTPROCEDURAL STATES: Chronic | ICD-10-CM

## 2023-11-03 DIAGNOSIS — Z90.89 ACQUIRED ABSENCE OF OTHER ORGANS: Chronic | ICD-10-CM

## 2023-11-03 DIAGNOSIS — Z90.710 ACQUIRED ABSENCE OF BOTH CERVIX AND UTERUS: Chronic | ICD-10-CM

## 2023-11-03 PROCEDURE — 77063 BREAST TOMOSYNTHESIS BI: CPT | Mod: 26

## 2023-11-03 PROCEDURE — 77063 BREAST TOMOSYNTHESIS BI: CPT

## 2023-11-03 PROCEDURE — 77067 SCR MAMMO BI INCL CAD: CPT

## 2023-11-03 PROCEDURE — 77067 SCR MAMMO BI INCL CAD: CPT | Mod: 26

## 2023-11-04 DIAGNOSIS — Z12.31 ENCOUNTER FOR SCREENING MAMMOGRAM FOR MALIGNANT NEOPLASM OF BREAST: ICD-10-CM

## 2023-12-19 RX ORDER — NITROFURANTOIN (MONOHYDRATE/MACROCRYSTALS) 25; 75 MG/1; MG/1
100 CAPSULE ORAL
Qty: 30 | Refills: 2 | Status: COMPLETED | COMMUNITY
Start: 2023-12-19 | End: 2024-03-18

## 2024-01-05 ENCOUNTER — APPOINTMENT (OUTPATIENT)
Dept: ORTHOPEDIC SURGERY | Facility: CLINIC | Age: 84
End: 2024-01-05
Payer: MEDICARE

## 2024-01-05 PROCEDURE — 99213 OFFICE O/P EST LOW 20 MIN: CPT

## 2024-01-05 NOTE — HISTORY OF PRESENT ILLNESS
Patient calling for dexa scan results done on 11-      # [phone]  
Patient informed of Dexa scan results & recommended follow up/KA.  Patient takes vit D 1000 IU/day.  She will restart calcium supp 1200 mg/day (has minimal dietary calcium intake).  
Ramiro patient's as re: Dexa scan results & recommendations/WILBUR  
[de-identified] : Patient is a 83-year-old female status post right total knee replacement 7/24/2023.  Patient is doing well at this point.  No complaints.

## 2024-01-05 NOTE — ASSESSMENT
[FreeTextEntry1] : Patient is doing well at this point.  Advised to continue activities as tolerated.  Follow-up in 1 year for reevaluation.  Call with any questions or concerns.

## 2024-01-05 NOTE — PHYSICAL EXAM
[Right] : right knee [] : light touch is intact throughout [de-identified] : Good strength [TWNoteComboBox7] : flexion 120 degrees [de-identified] : extension 0 degrees

## 2024-02-07 ENCOUNTER — APPOINTMENT (OUTPATIENT)
Dept: UROGYNECOLOGY | Facility: CLINIC | Age: 84
End: 2024-02-07
Payer: MEDICARE

## 2024-02-07 VITALS
SYSTOLIC BLOOD PRESSURE: 145 MMHG | DIASTOLIC BLOOD PRESSURE: 63 MMHG | HEIGHT: 66 IN | WEIGHT: 207 LBS | BODY MASS INDEX: 33.27 KG/M2 | HEART RATE: 72 BPM

## 2024-02-07 DIAGNOSIS — M54.16 RADICULOPATHY, LUMBAR REGION: ICD-10-CM

## 2024-02-07 DIAGNOSIS — Z12.11 ENCOUNTER FOR SCREENING FOR MALIGNANT NEOPLASM OF COLON: ICD-10-CM

## 2024-02-07 DIAGNOSIS — K80.20 CALCULUS OF GALLBLADDER W/OUT CHOLECYSTITIS W/OUT OBSTRUCTION: ICD-10-CM

## 2024-02-07 DIAGNOSIS — Z87.898 PERSONAL HISTORY OF OTHER SPECIFIED CONDITIONS: ICD-10-CM

## 2024-02-07 DIAGNOSIS — Z86.010 PERSONAL HISTORY OF COLONIC POLYPS: ICD-10-CM

## 2024-02-07 DIAGNOSIS — Z87.19 PERSONAL HISTORY OF OTHER DISEASES OF THE DIGESTIVE SYSTEM: ICD-10-CM

## 2024-02-07 DIAGNOSIS — Z86.69 PERSONAL HISTORY OF OTHER DISEASES OF THE NERVOUS SYSTEM AND SENSE ORGANS: ICD-10-CM

## 2024-02-07 DIAGNOSIS — Z96.651 PRESENCE OF RIGHT ARTIFICIAL KNEE JOINT: ICD-10-CM

## 2024-02-07 DIAGNOSIS — M51.17 INTERVERTEBRAL DISC DISORDERS WITH RADICULOPATHY, LUMBOSACRAL REGION: ICD-10-CM

## 2024-02-07 DIAGNOSIS — Z87.39 PERSONAL HISTORY OF OTHER DISEASES OF THE MUSCULOSKELETAL SYSTEM AND CONNECTIVE TISSUE: ICD-10-CM

## 2024-02-07 DIAGNOSIS — Z86.018 PERSONAL HISTORY OF OTHER BENIGN NEOPLASM: ICD-10-CM

## 2024-02-07 DIAGNOSIS — Z78.9 OTHER SPECIFIED HEALTH STATUS: ICD-10-CM

## 2024-02-07 DIAGNOSIS — Z87.440 PERSONAL HISTORY OF URINARY (TRACT) INFECTIONS: ICD-10-CM

## 2024-02-07 DIAGNOSIS — Z98.890 OTHER SPECIFIED POSTPROCEDURAL STATES: ICD-10-CM

## 2024-02-07 LAB
BILIRUB UR QL STRIP: NORMAL
CLARITY UR: CLEAR
COLLECTION METHOD: NORMAL
GLUCOSE UR-MCNC: NORMAL
HCG UR QL: 0.2 EU/DL
HGB UR QL STRIP.AUTO: NORMAL
KETONES UR-MCNC: NORMAL
LEUKOCYTE ESTERASE UR QL STRIP: NORMAL
NITRITE UR QL STRIP: NORMAL
PH UR STRIP: 5
PROT UR STRIP-MCNC: NORMAL
SP GR UR STRIP: 1.03

## 2024-02-07 PROCEDURE — 52000 CYSTOURETHROSCOPY: CPT

## 2024-02-07 PROCEDURE — 99213 OFFICE O/P EST LOW 20 MIN: CPT | Mod: 25

## 2024-02-07 PROCEDURE — 81003 URINALYSIS AUTO W/O SCOPE: CPT | Mod: QW

## 2024-02-07 RX ORDER — ASCORBIC ACID 500 MG
TABLET ORAL
Refills: 0 | Status: COMPLETED | COMMUNITY
End: 2024-02-07

## 2024-02-07 RX ORDER — ESTRADIOL 0.1 MG/G
0.1 CREAM VAGINAL
Qty: 1 | Refills: 3 | Status: ACTIVE | COMMUNITY
Start: 2023-12-19 | End: 1900-01-01

## 2024-02-07 RX ORDER — METHOTREXATE 2.5 MG/1
2.5 TABLET ORAL
Qty: 22 | Refills: 0 | Status: COMPLETED | COMMUNITY
Start: 2022-12-20 | End: 2024-02-07

## 2024-02-07 RX ORDER — NAPROXEN 500 MG/1
TABLET ORAL
Refills: 0 | Status: COMPLETED | COMMUNITY
End: 2024-02-07

## 2024-02-07 RX ORDER — AMOXICILLIN 500 MG/1
500 TABLET, FILM COATED ORAL
Qty: 12 | Refills: 0 | Status: COMPLETED | COMMUNITY
Start: 2024-01-18 | End: 2024-02-07

## 2024-02-07 RX ORDER — BIOTIN 10 MG
TABLET ORAL
Refills: 0 | Status: COMPLETED | COMMUNITY
End: 2024-02-07

## 2024-02-07 RX ORDER — TRAMADOL HYDROCHLORIDE 50 MG/1
50 TABLET, COATED ORAL EVERY 8 HOURS
Qty: 42 | Refills: 0 | Status: COMPLETED | COMMUNITY
Start: 2023-08-03 | End: 2024-02-07

## 2024-02-07 RX ORDER — PSYLLIUM HUSK 0.4 G
CAPSULE ORAL
Refills: 0 | Status: COMPLETED | COMMUNITY
End: 2024-02-07

## 2024-03-01 NOTE — ASU PATIENT PROFILE, ADULT - ABILITY TO HEAR (WITH HEARING AID OR HEARING APPLIANCE IF NORMALLY USED):
Adequate: hears normal conversation without difficulty 0 (no pain/absence of nonverbal indicators of pain)

## 2024-03-13 NOTE — ASU PREOP CHECKLIST - RESPIRATORY RATE (BREATHS/MIN)
New Rx placed and routed to Dr. Negrete to sign. Patient notified. Patient wants rx faxed to [facility]  at [phone]  
Patient has run out of her Trelegy Ellipta 100-62.5-25 MCG.  Asking if she can get some samples until she can get some called into new Pharmacy.  [facility]-[phone]/  needs this to be for 90 days supply.  
18

## 2024-03-20 ENCOUNTER — APPOINTMENT (OUTPATIENT)
Dept: UROGYNECOLOGY | Facility: CLINIC | Age: 84
End: 2024-03-20
Payer: MEDICARE

## 2024-03-20 VITALS
HEART RATE: 81 BPM | BODY MASS INDEX: 33.27 KG/M2 | HEIGHT: 66 IN | SYSTOLIC BLOOD PRESSURE: 125 MMHG | DIASTOLIC BLOOD PRESSURE: 77 MMHG | WEIGHT: 207 LBS

## 2024-03-20 PROCEDURE — 99214 OFFICE O/P EST MOD 30 MIN: CPT | Mod: 25

## 2024-03-20 PROCEDURE — 51701 INSERT BLADDER CATHETER: CPT

## 2024-03-20 RX ORDER — NITROFURANTOIN (MONOHYDRATE/MACROCRYSTALS) 25; 75 MG/1; MG/1
100 CAPSULE ORAL
Qty: 30 | Refills: 2 | Status: COMPLETED | COMMUNITY
Start: 2024-03-20 | End: 2024-06-18

## 2024-03-20 RX ORDER — TROSPIUM CHLORIDE 20 MG/1
20 TABLET, FILM COATED ORAL
Qty: 60 | Refills: 2 | Status: DISCONTINUED | COMMUNITY
Start: 2024-02-07 | End: 2024-03-20

## 2024-03-21 NOTE — HISTORY OF PRESENT ILLNESS
[FreeTextEntry1] : Patient is here for 6 weeks med check for urge incontinence and recurrent UTI. Last seen on 2/7/2024 for cystoscopy with Dr. Bains  1/29/2024: CT: kidneys normal Was started on macrobid 100mg daily for suppression since 12/19/2023 Was prescribed estrogen cream from [email or web address], but they never called her that it was ready  Last seen on12/2022 with Dr Venegas for urge incontinence Was prescribed myrbetriq 25mg, expensive, picked it up and never started it Hates taking medications but has no time for pelvic PT No glaucoma  2/7/24 cysto normal  Trospium 20 mg BID   Today, patient states she is taking Trospium 20 mg BID and is noticing 40% improvement. She has been experiencing heartburn, constipation and upset stomach from the medication. Patient always feels burning, would like the urine checked for infection. She is currently taking macrobid suppression and happy with it, would like to continue.  She is using estrace cream three times a week.

## 2024-03-21 NOTE — REASON FOR VISIT
[TextEntry] : Reason for visit: 6 Week Medication Follow Up Voids per day: 7-10   Voids per night: 2-3   Urge incontinence: Occasionally (+) urgency   Stress incontinence: No Constipation: Yes, Using Miralax prn Fecal incontinence: No Vaginal bulge: No

## 2024-03-21 NOTE — PHYSICAL EXAM
[Chaperone Present] : A chaperone was present in the examining room during all aspects of the physical examination [No Acute Distress] : in no acute distress [Well developed] : well developed [Well Nourished] : ~L well nourished [FreeTextEntry1] : Indication: Recurrent UTI Urethra was prepped in sterile fashion and then a sterile non- indwelling catheter (14F) was used by me to drain the bladder. The patient tolerated the procedure well. cath: 70 cc  + atrophy

## 2024-03-21 NOTE — END OF VISIT
[TextEntry] :  I, Wilma Mays PA-C, spent 30 minutes face to face with the patient. This excludes cath

## 2024-03-21 NOTE — COUNSELING
[FreeTextEntry1] : If you feel like you have an infection it is important for you to call our office and we will arrange testing of your urine.  Please continue taking macrobid daily to help prevent infection. Refills sent to your pharmacy.  Please STOP taking Trospium 20 mg.  Please begin taking Myrbetriq 25 mg. It takes up to 6 weeks to go into full effect. Please  your refill when you complete the 1st bottle. Please take your blood pressure 2 weeks after starting the medication and call the office if your blood pressure is elevated.   We will contact you if the urine results are abnormal.  Please continue to apply a pea size amount to the opening of the vagina three times a week.   Please call my office if you have any issues with the cost or side effects of the medication.   Schedule a 6-8 week follow up med check/BP check appointment.

## 2024-03-21 NOTE — DISCUSSION/SUMMARY
[FreeTextEntry1] :  Recurrent UTI Urine culture obtained. Will follow up. Will treat accordingly if necessary Patient would like to continue suppression therapy for another 3 months, will send refill of macrobid suppression. Consider D-mannose when she completes the course of macrobid suppression   Urge Incontinence- D/C Trospium 20 mg BID Advised to follow up with PCP if her heartburn and loss of appetite continue after stopping the medication Discussed options for urge incontinence including dietary changes, pelvic floor physical therapy, medication management and third line procedure options. Discussed risk/benefit of anticholinergics versus beta agonists. Patient does not want to take anticholinergic medications due to risk of side effects and memory loss. Discussed beta agonists, patient would like to try Myrbetriq 25 mg. Also discussed third line therapy options including Botox injections, Interstim and PTNS, she may consider PTNS in the future but would like to try Myrbetriq at this time. Rx Myrbetriq 25 mg, precautions reviewed Will return for follow up visit/BP check in 6-8 weeks or earlier if she has any issues  Atrophic Vaginitis: Advised to continue to apply a pea size amount of the cream to the opening of the vagina three nights per week. Advised that this can help for the burning that she is experiencing. If the burning continues despite estrogen use, consider additional management (gabapentin for the burning).

## 2024-03-24 LAB — URINE CULTURE <10: NORMAL

## 2024-04-30 ENCOUNTER — APPOINTMENT (OUTPATIENT)
Dept: ORTHOPEDIC SURGERY | Facility: CLINIC | Age: 84
End: 2024-04-30
Payer: MEDICARE

## 2024-04-30 PROCEDURE — 99204 OFFICE O/P NEW MOD 45 MIN: CPT

## 2024-04-30 NOTE — ASSESSMENT
[FreeTextEntry1] : Patient comes in stating that she went to a dermatologist and had a biopsy done of her left hand.  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  She was told that she needed to see a hand surgeon for surgical excision.  She does not complain of pain.  She says the biopsy site is so small she can barely see it.  Left hand: No gross deformities visualized, small scar about a millimeter in 2 near the fourth metacarpal proximal to the MP joint, nontender palpation, neurovascular intact  Patient has squamous cell carcinoma after biopsy.  I believe she just needs surgical excision of that scar site to make sure that she has clear margins.  I discussed with the patient she may need to return back to the operating room in case we do not have clear margins however the area where it looks as though she had the biopsy is quite small and therefore I believe this should go quite well.  I discussed this thoroughly with the patient.  She has enough laxity in her skin that a primary closure would be expected.  The patient will follow-up postoperatively.

## 2024-05-03 NOTE — ASU PATIENT PROFILE, ADULT - FALL HARM RISK - HARM RISK INTERVENTIONS

## 2024-05-06 ENCOUNTER — APPOINTMENT (OUTPATIENT)
Dept: ORTHOPEDIC SURGERY | Facility: AMBULATORY SURGERY CENTER | Age: 84
End: 2024-05-06

## 2024-05-06 ENCOUNTER — OUTPATIENT (OUTPATIENT)
Dept: OUTPATIENT SERVICES | Facility: HOSPITAL | Age: 84
LOS: 1 days | Discharge: HOME | End: 2024-05-06
Payer: MEDICARE

## 2024-05-06 ENCOUNTER — RESULT REVIEW (OUTPATIENT)
Age: 84
End: 2024-05-06

## 2024-05-06 ENCOUNTER — TRANSCRIPTION ENCOUNTER (OUTPATIENT)
Age: 84
End: 2024-05-06

## 2024-05-06 VITALS
HEART RATE: 82 BPM | TEMPERATURE: 98 F | OXYGEN SATURATION: 95 % | RESPIRATION RATE: 20 BRPM | SYSTOLIC BLOOD PRESSURE: 151 MMHG | DIASTOLIC BLOOD PRESSURE: 67 MMHG

## 2024-05-06 VITALS
DIASTOLIC BLOOD PRESSURE: 62 MMHG | TEMPERATURE: 98 F | WEIGHT: 207.01 LBS | RESPIRATION RATE: 18 BRPM | OXYGEN SATURATION: 96 % | HEART RATE: 62 BPM | HEIGHT: 66 IN | SYSTOLIC BLOOD PRESSURE: 138 MMHG

## 2024-05-06 DIAGNOSIS — L08.9 LOCAL INFECTION OF THE SKIN AND SUBCUTANEOUS TISSUE, UNSPECIFIED: ICD-10-CM

## 2024-05-06 DIAGNOSIS — M17.9 OSTEOARTHRITIS OF KNEE, UNSPECIFIED: ICD-10-CM

## 2024-05-06 DIAGNOSIS — C44.629 SQUAMOUS CELL CARCINOMA OF SKIN OF LEFT UPPER LIMB, INCLUDING SHOULDER: ICD-10-CM

## 2024-05-06 DIAGNOSIS — Z90.710 ACQUIRED ABSENCE OF BOTH CERVIX AND UTERUS: Chronic | ICD-10-CM

## 2024-05-06 DIAGNOSIS — Z88.2 ALLERGY STATUS TO SULFONAMIDES: ICD-10-CM

## 2024-05-06 DIAGNOSIS — I48.91 UNSPECIFIED ATRIAL FIBRILLATION: ICD-10-CM

## 2024-05-06 DIAGNOSIS — Z91.040 LATEX ALLERGY STATUS: ICD-10-CM

## 2024-05-06 DIAGNOSIS — L57.8 OTHER SKIN CHANGES DUE TO CHRONIC EXPOSURE TO NONIONIZING RADIATION: ICD-10-CM

## 2024-05-06 DIAGNOSIS — Z98.890 OTHER SPECIFIED POSTPROCEDURAL STATES: Chronic | ICD-10-CM

## 2024-05-06 DIAGNOSIS — Z90.49 ACQUIRED ABSENCE OF OTHER SPECIFIED PARTS OF DIGESTIVE TRACT: Chronic | ICD-10-CM

## 2024-05-06 DIAGNOSIS — Z90.89 ACQUIRED ABSENCE OF OTHER ORGANS: Chronic | ICD-10-CM

## 2024-05-06 DIAGNOSIS — Z79.82 LONG TERM (CURRENT) USE OF ASPIRIN: ICD-10-CM

## 2024-05-06 PROCEDURE — 11622 EXC S/N/H/F/G MAL+MRG 1.1-2: CPT | Mod: LT

## 2024-05-06 PROCEDURE — 88307 TISSUE EXAM BY PATHOLOGIST: CPT | Mod: 26

## 2024-05-06 PROCEDURE — 88307 TISSUE EXAM BY PATHOLOGIST: CPT

## 2024-05-06 RX ORDER — MIRABEGRON 50 MG/1
1 TABLET, EXTENDED RELEASE ORAL
Refills: 0 | DISCHARGE

## 2024-05-06 RX ORDER — MULTIVIT-MIN/FERROUS GLUCONATE 9 MG/15 ML
0 LIQUID (ML) ORAL
Refills: 0 | DISCHARGE

## 2024-05-06 RX ORDER — NITROFURANTOIN MACROCRYSTAL 50 MG
1 CAPSULE ORAL
Refills: 0 | DISCHARGE

## 2024-05-06 NOTE — ASU DISCHARGE PLAN (ADULT/PEDIATRIC) - PROVIDER TOKENS
Send to Trisha FONG    Patient  in the ED at [facility] on 2022
PROVIDER:[TOKEN:[[id number]]]

## 2024-05-06 NOTE — BRIEF OPERATIVE NOTE - NSICDXBRIEFPROCEDURE_GEN_ALL_CORE_FT
PROCEDURES:  Excision, neoplasm, malignant, hand, 1.1 cm to 2.0 cm in diameter 06-May-2024 08:42:53  Sudha Sanchez

## 2024-05-06 NOTE — BRIEF OPERATIVE NOTE - NSICDXBRIEFPREOP_GEN_ALL_CORE_FT
PRE-OP DIAGNOSIS:  Squamous cell carcinoma of hand, left 06-May-2024 08:43:55  Sudha Sanchez  
actual/standing

## 2024-05-06 NOTE — ASU DISCHARGE PLAN (ADULT/PEDIATRIC) - CARE PROVIDER_API CALL
Sudha Sanchez [facility]  Orthopaedic Surgery  [address] [place] [address]  Phone: [phone]  Fax: [phone]  Follow Up Time:

## 2024-05-07 ENCOUNTER — APPOINTMENT (OUTPATIENT)
Dept: UROGYNECOLOGY | Facility: CLINIC | Age: 84
End: 2024-05-07
Payer: MEDICARE

## 2024-05-07 VITALS
DIASTOLIC BLOOD PRESSURE: 73 MMHG | WEIGHT: 205 LBS | SYSTOLIC BLOOD PRESSURE: 109 MMHG | HEART RATE: 76 BPM | HEIGHT: 66 IN | BODY MASS INDEX: 32.95 KG/M2

## 2024-05-07 DIAGNOSIS — N95.2 POSTMENOPAUSAL ATROPHIC VAGINITIS: ICD-10-CM

## 2024-05-07 DIAGNOSIS — R35.1 NOCTURIA: ICD-10-CM

## 2024-05-07 DIAGNOSIS — N39.0 URINARY TRACT INFECTION, SITE NOT SPECIFIED: ICD-10-CM

## 2024-05-07 DIAGNOSIS — N39.41 URGE INCONTINENCE: ICD-10-CM

## 2024-05-07 PROCEDURE — 99214 OFFICE O/P EST MOD 30 MIN: CPT

## 2024-05-07 RX ORDER — MIRABEGRON 25 MG/1
25 TABLET, FILM COATED, EXTENDED RELEASE ORAL
Qty: 30 | Refills: 1 | Status: DISCONTINUED | COMMUNITY
Start: 2024-03-20 | End: 2024-05-07

## 2024-05-07 RX ORDER — PNV NO.95/FERROUS FUM/FOLIC AC 28MG-0.8MG
TABLET ORAL
Refills: 0 | Status: COMPLETED | COMMUNITY
End: 2024-05-07

## 2024-05-07 RX ORDER — VIBEGRON 75 MG/1
75 TABLET, FILM COATED ORAL
Qty: 30 | Refills: 1 | Status: ACTIVE | COMMUNITY
Start: 2024-05-07 | End: 1900-01-01

## 2024-05-07 RX ORDER — ERGOCALCIFEROL 1.25 MG/1
CAPSULE ORAL
Refills: 0 | Status: COMPLETED | COMMUNITY
End: 2024-05-07

## 2024-05-07 NOTE — COUNSELING
[FreeTextEntry1] : If you feel like you have an infection it is important for you to call our office and we will arrange testing of your urine.  Please continue taking macrobid daily to help prevent UTI.   Please STOP taking Myrbetriq 25 mg.  Please begin taking Gemtesa 75 mg. It takes up to 6 weeks to go into full effect. Please  your refill when you complete the 1st bottle.  Please continue to apply a pea size amount to the opening of the vagina three times a week.   We will contact you if the urine results are abnormal.  Please call my office if you have any issues with the cost or side effects of the medication.   Schedule a 6-8 week follow up med check appointment.

## 2024-05-07 NOTE — HISTORY OF PRESENT ILLNESS
[FreeTextEntry1] : Patient is here for 6 weeks med check for urge incontinence and recurrent UTI. Last seen on 3/20/2024 for med check for urge incontinence and recurrent UTI.   1/29/2024: CT: kidneys normal Was started on macrobid 100mg daily for suppression since 12/19/2023 Was prescribed estrogen cream from [facility], but they never called her that it was ready  Last seen on12/2022 with Dr Venegas for urge incontinence Was prescribed myrbetriq 25mg, expensive, picked it up and never started it Hates taking medications but has no time for pelvic PT No glaucoma  2/7/24 cysto normal Trospium 20 mg BID-40% improvement, heartburn, constipation and upset stomach Myrbetriq 25 mg  Today, patient states she is taking Myrbetriq 25 mg and noticing no improvement. Denies side effects. Noticing some leakage with coughing, sneezing and laughing which is new for her. Would like the urine checked. Still taking macrobid suppression. Denies burning or pain with urination.  She is using estrace cream three times a week.

## 2024-05-07 NOTE — DISCUSSION/SUMMARY
[FreeTextEntry1] :  Recurrent UTI- Urine culture obtained. (clean catch) Will follow up. Will treat accordingly if necessary Continue macrobid suppression  Atrophic vaginitis- continue using estrace cream  Urge incontinence- Discussed increasing dosage of Myrbetriq, trying Gemtesa or trying an anticholinergic. Patient would like to try Gemtesa. D/C Myrbetriq. Rx Gemtesa 75 mg. Precautions reviewed. Will return for follow up visit in 6-8 weeks or earlier if she has any issues

## 2024-05-07 NOTE — REASON FOR VISIT
[TextEntry] : Reason for visit: Follow Up - Med Check Voids per day: every 2 hours    Voids per night:   2-3 Urge incontinence: Occasional (+) leakage (+) urgency Stress incontinence:  Occasional (+) sneeze (+) cough Constipation: No  Fecal incontinence: Rare    Vaginal bulge: No

## 2024-05-09 LAB — BACTERIA UR CULT: NORMAL

## 2024-05-15 LAB — SURGICAL PATHOLOGY STUDY: SIGNIFICANT CHANGE UP

## 2024-05-16 ENCOUNTER — APPOINTMENT (OUTPATIENT)
Dept: ORTHOPEDIC SURGERY | Facility: CLINIC | Age: 84
End: 2024-05-16
Payer: MEDICARE

## 2024-05-16 PROCEDURE — 99024 POSTOP FOLLOW-UP VISIT: CPT

## 2024-05-16 NOTE — HISTORY OF PRESENT ILLNESS
[de-identified] : 84-year-old female here for her first postop evaluation status post left hand squamous cell carcinoma excision.  She is doing well overall.  Reports her pain is well-controlled.  Denies any numbness or tingling.  Denies any signs of infection.  Denies any active bleeding or discharge.  Pathology was reviewed which confirmed squamous cell carcinoma.  Pathology confirmed no evidence of residual squamous cell carcinoma identified.

## 2024-05-16 NOTE — PHYSICAL EXAM
[de-identified] : Left hand: Mild hand swelling Sutures removed Healed incision No evidence of infection.  No active bleeding or discharge.  No erythema appreciated. Mild tenderness at the surgical site Good finger ROM.  Can make a full fist.  Sensorimotor intact distally.  Neurovascularly intact.

## 2024-05-16 NOTE — DISCUSSION/SUMMARY
[de-identified] : The patient's sutures were removed in the office today.  The surgical incision site is clean dry and intact and without any signs of infection. We discussed scar massage.  Encouraged gentle range of motion of the wrist and fingers.  Encouraged patient to work on making a full fist. Advised to refrain from heavy lifting/pulling/pushing until repeat evaluation. Patient understands that some residual pain, swelling, and numbness/tingling following surgery is normal and expected. Pathology was reviewed which confirmed squamous cell carcinoma.  Pathology confirmed no evidence of residual squamous cell carcinoma identified.  Red flag symptoms were discussed. All questions and concerns addressed to patient's satisfaction. Patient expresses full understanding of treatment plan. I will give the patient follow-up in 1 month with Dr. Sanchez for repeat evaluation and treatment if the patient feels as if she needs to be reevaluated. conducted a detailed discussion... I had a detailed discussion with the patient and/or guardian regarding the historical points, exam findings, and any diagnostic results supporting the discharge/admit diagnosis.

## 2024-06-09 NOTE — ASU PATIENT PROFILE, ADULT - AS SC BRADEN ACTIVITY
Check micronutrient levels - see orders  Adjust micronutrient supplementation per results  Continue recommended post bariatric surgery supplements   (3) walks occasionally

## 2024-06-25 ENCOUNTER — APPOINTMENT (OUTPATIENT)
Dept: ORTHOPEDIC SURGERY | Facility: CLINIC | Age: 84
End: 2024-06-25

## 2024-06-25 DIAGNOSIS — C44.629 SQUAMOUS CELL CARCINOMA OF SKIN OF LEFT UPPER LIMB, INCLUDING SHOULDER: ICD-10-CM

## 2024-06-25 PROCEDURE — 99212 OFFICE O/P EST SF 10 MIN: CPT

## 2024-07-09 ENCOUNTER — APPOINTMENT (OUTPATIENT)
Dept: UROGYNECOLOGY | Facility: CLINIC | Age: 84
End: 2024-07-09
Payer: MEDICARE

## 2024-07-09 VITALS
SYSTOLIC BLOOD PRESSURE: 109 MMHG | HEIGHT: 66 IN | WEIGHT: 205 LBS | BODY MASS INDEX: 32.95 KG/M2 | DIASTOLIC BLOOD PRESSURE: 71 MMHG | HEART RATE: 80 BPM

## 2024-07-09 DIAGNOSIS — N39.41 URGE INCONTINENCE: ICD-10-CM

## 2024-07-09 DIAGNOSIS — N39.0 URINARY TRACT INFECTION, SITE NOT SPECIFIED: ICD-10-CM

## 2024-07-09 DIAGNOSIS — N95.2 POSTMENOPAUSAL ATROPHIC VAGINITIS: ICD-10-CM

## 2024-07-09 PROCEDURE — 99214 OFFICE O/P EST MOD 30 MIN: CPT

## 2024-07-09 RX ORDER — DICLOFENAC SODIUM 25 MG/1
25 TABLET, DELAYED RELEASE ORAL
Refills: 0 | Status: ACTIVE | COMMUNITY

## 2024-07-09 RX ORDER — CYCLOBENZAPRINE HYDROCHLORIDE 5 MG/1
5 TABLET, FILM COATED ORAL
Refills: 0 | Status: ACTIVE | COMMUNITY

## 2024-07-15 LAB — BACTERIA UR CULT: ABNORMAL

## 2024-11-11 ENCOUNTER — OUTPATIENT (OUTPATIENT)
Dept: OUTPATIENT SERVICES | Facility: HOSPITAL | Age: 84
LOS: 1 days | End: 2024-11-11
Payer: MEDICARE

## 2024-11-11 DIAGNOSIS — Z90.49 ACQUIRED ABSENCE OF OTHER SPECIFIED PARTS OF DIGESTIVE TRACT: Chronic | ICD-10-CM

## 2024-11-11 DIAGNOSIS — Z12.31 ENCOUNTER FOR SCREENING MAMMOGRAM FOR MALIGNANT NEOPLASM OF BREAST: ICD-10-CM

## 2024-11-11 DIAGNOSIS — Z98.890 OTHER SPECIFIED POSTPROCEDURAL STATES: Chronic | ICD-10-CM

## 2024-11-11 DIAGNOSIS — Z90.710 ACQUIRED ABSENCE OF BOTH CERVIX AND UTERUS: Chronic | ICD-10-CM

## 2024-11-11 DIAGNOSIS — Z90.89 ACQUIRED ABSENCE OF OTHER ORGANS: Chronic | ICD-10-CM

## 2024-11-11 PROCEDURE — 77063 BREAST TOMOSYNTHESIS BI: CPT

## 2024-11-11 PROCEDURE — 77063 BREAST TOMOSYNTHESIS BI: CPT | Mod: 26

## 2024-11-11 PROCEDURE — 77067 SCR MAMMO BI INCL CAD: CPT

## 2024-11-11 PROCEDURE — 77067 SCR MAMMO BI INCL CAD: CPT | Mod: 26

## 2024-11-12 DIAGNOSIS — Z12.31 ENCOUNTER FOR SCREENING MAMMOGRAM FOR MALIGNANT NEOPLASM OF BREAST: ICD-10-CM

## 2024-11-25 NOTE — ASU DISCHARGE PLAN (ADULT/PEDIATRIC) - FOLLOW UP APPOINTMENTS
Spoke with patient. Casted last week and she has had swelling and pain all weekend. Feels cast is too tight. Having increased pain. Tramadol prescribed is upsetting her stomach. She states she is taking it with food. I spoke with the PA and he indicated to get her in today. Scheduled her for 1 pm    [facility]:  [facility]

## 2025-01-07 ENCOUNTER — APPOINTMENT (OUTPATIENT)
Dept: UROGYNECOLOGY | Facility: CLINIC | Age: 85
End: 2025-01-07

## 2025-04-28 ENCOUNTER — INPATIENT (INPATIENT)
Facility: HOSPITAL | Age: 85
LOS: 2 days | Discharge: HOME | DRG: 149 | End: 2025-05-01
Attending: STUDENT IN AN ORGANIZED HEALTH CARE EDUCATION/TRAINING PROGRAM | Admitting: INTERNAL MEDICINE
Payer: MEDICARE

## 2025-04-28 VITALS
HEART RATE: 84 BPM | TEMPERATURE: 97 F | WEIGHT: 220.02 LBS | OXYGEN SATURATION: 94 % | DIASTOLIC BLOOD PRESSURE: 88 MMHG | RESPIRATION RATE: 20 BRPM | SYSTOLIC BLOOD PRESSURE: 150 MMHG

## 2025-04-28 DIAGNOSIS — Z90.710 ACQUIRED ABSENCE OF BOTH CERVIX AND UTERUS: Chronic | ICD-10-CM

## 2025-04-28 DIAGNOSIS — Z98.890 OTHER SPECIFIED POSTPROCEDURAL STATES: Chronic | ICD-10-CM

## 2025-04-28 DIAGNOSIS — Z90.49 ACQUIRED ABSENCE OF OTHER SPECIFIED PARTS OF DIGESTIVE TRACT: Chronic | ICD-10-CM

## 2025-04-28 DIAGNOSIS — R42 DIZZINESS AND GIDDINESS: ICD-10-CM

## 2025-04-28 DIAGNOSIS — Z90.89 ACQUIRED ABSENCE OF OTHER ORGANS: Chronic | ICD-10-CM

## 2025-04-28 LAB
ALBUMIN SERPL ELPH-MCNC: 4 G/DL — SIGNIFICANT CHANGE UP (ref 3.5–5.2)
ALP SERPL-CCNC: 32 U/L — SIGNIFICANT CHANGE UP (ref 30–115)
ALT FLD-CCNC: 31 U/L — SIGNIFICANT CHANGE UP (ref 0–41)
ANION GAP SERPL CALC-SCNC: 11 MMOL/L — SIGNIFICANT CHANGE UP (ref 7–14)
ANION GAP SERPL CALC-SCNC: 8 MMOL/L — SIGNIFICANT CHANGE UP (ref 7–14)
APTT BLD: 20.7 SEC — CRITICAL LOW (ref 27–39.2)
AST SERPL-CCNC: 52 U/L — HIGH (ref 0–41)
BASOPHILS # BLD AUTO: 0.03 K/UL — SIGNIFICANT CHANGE UP (ref 0–0.2)
BASOPHILS NFR BLD AUTO: 0.3 % — SIGNIFICANT CHANGE UP (ref 0–1)
BILIRUB SERPL-MCNC: 0.4 MG/DL — SIGNIFICANT CHANGE UP (ref 0.2–1.2)
BUN SERPL-MCNC: 17 MG/DL — SIGNIFICANT CHANGE UP (ref 10–20)
BUN SERPL-MCNC: 20 MG/DL — SIGNIFICANT CHANGE UP (ref 10–20)
CALCIUM SERPL-MCNC: 9.1 MG/DL — SIGNIFICANT CHANGE UP (ref 8.4–10.5)
CALCIUM SERPL-MCNC: 9.1 MG/DL — SIGNIFICANT CHANGE UP (ref 8.4–10.5)
CHLORIDE SERPL-SCNC: 103 MMOL/L — SIGNIFICANT CHANGE UP (ref 98–110)
CHLORIDE SERPL-SCNC: 104 MMOL/L — SIGNIFICANT CHANGE UP (ref 98–110)
CO2 SERPL-SCNC: 22 MMOL/L — SIGNIFICANT CHANGE UP (ref 17–32)
CO2 SERPL-SCNC: 28 MMOL/L — SIGNIFICANT CHANGE UP (ref 17–32)
CREAT SERPL-MCNC: 1 MG/DL — SIGNIFICANT CHANGE UP (ref 0.7–1.5)
CREAT SERPL-MCNC: 1 MG/DL — SIGNIFICANT CHANGE UP (ref 0.7–1.5)
EGFR: 56 ML/MIN/1.73M2 — LOW
EOSINOPHIL # BLD AUTO: 0.02 K/UL — SIGNIFICANT CHANGE UP (ref 0–0.7)
EOSINOPHIL NFR BLD AUTO: 0.2 % — SIGNIFICANT CHANGE UP (ref 0–8)
GLUCOSE SERPL-MCNC: 139 MG/DL — HIGH (ref 70–99)
GLUCOSE SERPL-MCNC: 191 MG/DL — HIGH (ref 70–99)
HCT VFR BLD CALC: 42.1 % — SIGNIFICANT CHANGE UP (ref 37–47)
HGB BLD-MCNC: 14 G/DL — SIGNIFICANT CHANGE UP (ref 12–16)
IMM GRANULOCYTES NFR BLD AUTO: 0.3 % — SIGNIFICANT CHANGE UP (ref 0.1–0.3)
INR BLD: 0.88 RATIO — SIGNIFICANT CHANGE UP (ref 0.65–1.3)
LYMPHOCYTES # BLD AUTO: 1.35 K/UL — SIGNIFICANT CHANGE UP (ref 1.2–3.4)
LYMPHOCYTES # BLD AUTO: 13.4 % — LOW (ref 20.5–51.1)
MCHC RBC-ENTMCNC: 30.7 PG — SIGNIFICANT CHANGE UP (ref 27–31)
MCHC RBC-ENTMCNC: 33.3 G/DL — SIGNIFICANT CHANGE UP (ref 32–37)
MCV RBC AUTO: 92.3 FL — SIGNIFICANT CHANGE UP (ref 81–99)
MONOCYTES # BLD AUTO: 0.7 K/UL — HIGH (ref 0.1–0.6)
MONOCYTES NFR BLD AUTO: 6.9 % — SIGNIFICANT CHANGE UP (ref 1.7–9.3)
NEUTROPHILS # BLD AUTO: 7.95 K/UL — HIGH (ref 1.4–6.5)
NEUTROPHILS NFR BLD AUTO: 78.9 % — HIGH (ref 42.2–75.2)
NRBC BLD AUTO-RTO: 0 /100 WBCS — SIGNIFICANT CHANGE UP (ref 0–0)
PLATELET # BLD AUTO: 205 K/UL — SIGNIFICANT CHANGE UP (ref 130–400)
PMV BLD: 10.4 FL — SIGNIFICANT CHANGE UP (ref 7.4–10.4)
POTASSIUM SERPL-MCNC: 4.3 MMOL/L — SIGNIFICANT CHANGE UP (ref 3.5–5)
POTASSIUM SERPL-MCNC: 5.3 MMOL/L — HIGH (ref 3.5–5)
POTASSIUM SERPL-SCNC: 4.3 MMOL/L — SIGNIFICANT CHANGE UP (ref 3.5–5)
POTASSIUM SERPL-SCNC: 5.3 MMOL/L — HIGH (ref 3.5–5)
PROT SERPL-MCNC: 6.7 G/DL — SIGNIFICANT CHANGE UP (ref 6–8)
PROTHROM AB SERPL-ACNC: 10.4 SEC — SIGNIFICANT CHANGE UP (ref 9.95–12.87)
RBC # BLD: 4.56 M/UL — SIGNIFICANT CHANGE UP (ref 4.2–5.4)
RBC # FLD: 14.1 % — SIGNIFICANT CHANGE UP (ref 11.5–14.5)
SODIUM SERPL-SCNC: 137 MMOL/L — SIGNIFICANT CHANGE UP (ref 135–146)
SODIUM SERPL-SCNC: 139 MMOL/L — SIGNIFICANT CHANGE UP (ref 135–146)
TROPONIN T, HIGH SENSITIVITY RESULT: 11 NG/L — SIGNIFICANT CHANGE UP (ref 6–13)
WBC # BLD: 10.08 K/UL — SIGNIFICANT CHANGE UP (ref 4.8–10.8)
WBC # FLD AUTO: 10.08 K/UL — SIGNIFICANT CHANGE UP (ref 4.8–10.8)

## 2025-04-28 PROCEDURE — 70496 CT ANGIOGRAPHY HEAD: CPT | Mod: 26

## 2025-04-28 PROCEDURE — 70551 MRI BRAIN STEM W/O DYE: CPT | Mod: MC

## 2025-04-28 PROCEDURE — 85027 COMPLETE CBC AUTOMATED: CPT

## 2025-04-28 PROCEDURE — 80048 BASIC METABOLIC PNL TOTAL CA: CPT

## 2025-04-28 PROCEDURE — 99222 1ST HOSP IP/OBS MODERATE 55: CPT

## 2025-04-28 PROCEDURE — 97165 OT EVAL LOW COMPLEX 30 MIN: CPT | Mod: GO

## 2025-04-28 PROCEDURE — 92610 EVALUATE SWALLOWING FUNCTION: CPT | Mod: GN

## 2025-04-28 PROCEDURE — 93010 ELECTROCARDIOGRAM REPORT: CPT

## 2025-04-28 PROCEDURE — 70498 CT ANGIOGRAPHY NECK: CPT | Mod: 26

## 2025-04-28 PROCEDURE — 83036 HEMOGLOBIN GLYCOSYLATED A1C: CPT

## 2025-04-28 PROCEDURE — 80061 LIPID PANEL: CPT

## 2025-04-28 PROCEDURE — 92526 ORAL FUNCTION THERAPY: CPT | Mod: GN

## 2025-04-28 PROCEDURE — 99285 EMERGENCY DEPT VISIT HI MDM: CPT

## 2025-04-28 PROCEDURE — 36415 COLL VENOUS BLD VENIPUNCTURE: CPT

## 2025-04-28 PROCEDURE — 97162 PT EVAL MOD COMPLEX 30 MIN: CPT | Mod: GP

## 2025-04-28 PROCEDURE — 0042T: CPT

## 2025-04-28 PROCEDURE — 70450 CT HEAD/BRAIN W/O DYE: CPT | Mod: 26,XU

## 2025-04-28 RX ORDER — ASPIRIN 325 MG
81 TABLET ORAL DAILY
Refills: 0 | Status: DISCONTINUED | OUTPATIENT
Start: 2025-04-28 | End: 2025-05-01

## 2025-04-28 RX ORDER — ATORVASTATIN CALCIUM 80 MG/1
80 TABLET, FILM COATED ORAL AT BEDTIME
Refills: 0 | Status: DISCONTINUED | OUTPATIENT
Start: 2025-04-28 | End: 2025-04-30

## 2025-04-28 RX ORDER — MELATONIN 5 MG
3 TABLET ORAL AT BEDTIME
Refills: 0 | Status: DISCONTINUED | OUTPATIENT
Start: 2025-04-28 | End: 2025-05-01

## 2025-04-28 RX ORDER — MAGNESIUM, ALUMINUM HYDROXIDE 200-200 MG
30 TABLET,CHEWABLE ORAL EVERY 4 HOURS
Refills: 0 | Status: DISCONTINUED | OUTPATIENT
Start: 2025-04-28 | End: 2025-05-01

## 2025-04-28 RX ORDER — PREDNISOLONE 5 MG
1 TABLET ORAL
Refills: 0 | DISCHARGE

## 2025-04-28 RX ORDER — ACETAMINOPHEN 500 MG/5ML
650 LIQUID (ML) ORAL EVERY 6 HOURS
Refills: 0 | Status: DISCONTINUED | OUTPATIENT
Start: 2025-04-28 | End: 2025-05-01

## 2025-04-28 RX ORDER — PREDNISONE 20 MG/1
10 TABLET ORAL DAILY
Refills: 0 | Status: DISCONTINUED | OUTPATIENT
Start: 2025-04-28 | End: 2025-05-01

## 2025-04-28 RX ORDER — HEPARIN SODIUM 1000 [USP'U]/ML
5000 INJECTION INTRAVENOUS; SUBCUTANEOUS EVERY 12 HOURS
Refills: 0 | Status: DISCONTINUED | OUTPATIENT
Start: 2025-04-28 | End: 2025-05-01

## 2025-04-28 RX ORDER — ONDANSETRON HCL/PF 4 MG/2 ML
4 VIAL (ML) INJECTION EVERY 4 HOURS
Refills: 0 | Status: DISCONTINUED | OUTPATIENT
Start: 2025-04-28 | End: 2025-05-01

## 2025-04-28 RX ORDER — ENALAPRIL MALEATE 20 MG
5 TABLET ORAL
Refills: 0 | Status: DISCONTINUED | OUTPATIENT
Start: 2025-04-28 | End: 2025-05-01

## 2025-04-28 RX ORDER — NAPROXEN SODIUM 275 MG
500 TABLET ORAL
Refills: 0 | Status: DISCONTINUED | OUTPATIENT
Start: 2025-04-28 | End: 2025-05-01

## 2025-04-28 RX ORDER — MECLIZINE HCL 12.5 MG
25 TABLET ORAL EVERY 8 HOURS
Refills: 0 | Status: DISCONTINUED | OUTPATIENT
Start: 2025-04-28 | End: 2025-05-01

## 2025-04-28 RX ADMIN — Medication 5 MILLIGRAM(S): at 22:38

## 2025-04-28 RX ADMIN — Medication 40 MILLIGRAM(S): at 22:06

## 2025-04-28 NOTE — H&P ADULT - HISTORY OF PRESENT ILLNESS
86 y/o female c/o dizziness room was spining 84 y/o female c/o dizziness room was spining. The onset was sudden. There were no precipitating facotrs.  Patient states his symptoms started 4 hours ago while she was sitting.  Patient states that she also had a pain to left-sided forehead.  Patient did call her son.  Son was concerned by the way she was speaking and said it was hard for her to get her words out.  On arrival to ED all symptoms have resolved. pt denies chest pain nausea, vomiting. 86 y/o female accompanied by her  at bed side . Pt with a past med hx of HTN, Fibromyalgia c/o dizziness room was spining. The onset was sudden. There were no precipitating facotrs.  Patient states his symptoms started 4 hours ago while she was sitting.  Patient states that she also had a pain to left-sided forehead.  Patient did call her son.  Son was concerned by the way she was speaking and said it was hard for her to get her words out due to the room spinning.  On arrival to ED all symptoms have resolved. pt denies chest pain nausea, vomiting.

## 2025-04-28 NOTE — ED ADULT NURSE NOTE - NSFALLUNIVINTERV_ED_ALL_ED
Bed/Stretcher in lowest position, wheels locked, appropriate side rails in place/Call bell, personal items and telephone in reach/Instruct patient to call for assistance before getting out of bed/chair/stretcher/Non-slip footwear applied when patient is off stretcher/[place] to call system/Physically safe environment - no spills, clutter or unnecessary equipment/Purposeful proactive rounding/Room/bathroom lighting operational, light cord in reach
Equal and normal pulses (carotid, femoral, dorsalis pedis)

## 2025-04-28 NOTE — ED PROVIDER NOTE - OBJECTIVE STATEMENT
85-year-old female presents ED for evaluation of dizziness.  Patient states his symptoms started 4 hours ago while she was sitting.  Patient states that she also had a pain to left-sided forehead.  Patient did call her son.  Son was concerned by the way she was speaking and said it was hard for her to get her words out.  On arrival to ED all symptoms have resolved.

## 2025-04-28 NOTE — H&P ADULT - NSHPPHYSICALEXAM_GEN_ALL_CORE
Vital Signs Last 24 Hrs  T(C): 36.2 (28 Apr 2025 16:48), Max: 36.2 (28 Apr 2025 16:48)  T(F): 97.1 (28 Apr 2025 16:48), Max: 97.1 (28 Apr 2025 16:48)  HR: 82 (28 Apr 2025 18:50) (80 - 84)  BP: 156/69 (28 Apr 2025 18:50) (140/66 - 156/69)  BP(mean): --  RR: 18 (28 Apr 2025 18:50) (18 - 20)  SpO2: 100% (28 Apr 2025 16:50) (94% - 100%)    Parameters below as of 28 Apr 2025 16:50  Patient On (Oxygen Delivery Method): nasal cannula      GENERAL:  84y/o Female NAD, resting comfortably.  HEAD:  Atraumatic, Normocephalic  EYES: EOMI, PERRLA, conjunctiva and sclera clear  NECK: Supple, No JVD, no cervical lymphadenopathy, non-tender  CHEST/LUNG: Clear to auscultation bilaterally; No wheeze, rhonchi, or rales  HEART: Regular rate and rhythm; S1&S2  ABDOMEN: Soft, Nontender, Nondistended x 4 quadrants; Bowel sounds present  EXTREMITIES:   Peripheral Pulses Present, No clubbing, no cyanosis, or no edema, no calf tenderness  PSYCH: AAOx3, cooperative, appropriate  NEUROLOGY: WNL  SKIN: WNL

## 2025-04-28 NOTE — H&P ADULT - NSHPLABSRESULTS_GEN_ALL_CORE
IMPRESSION:    CT PERFUSION:  No perfusion deficits to suggest areas of completed infarction or at risk   territory.    CTA HEAD/NECK:  No large vessel occlusion, aneurysm, or vascular malformation.  Thyroid nodule recommend thyroid ultrasound.                          14.0   10.08 )-----------( 205      ( 28 Apr 2025 17:37 )             42.1       04-28    137  |  104  |  20  ----------------------------<  191[H]  5.3[H]   |  22  |  x     Ca    9.1      28 Apr 2025 17:37    TPro  6.7  /  Alb  4.0  /  TBili  0.4  /  DBili  x   /  AST  52[H]  /  ALT  x   /  AlkPhos  x   04-28              Urinalysis Basic - ( 28 Apr 2025 17:37 )    Color: x / Appearance: x / SG: x / pH: x  Gluc: 191 mg/dL / Ketone: x  / Bili: x / Urobili: x   Blood: x / Protein: x / Nitrite: x   Leuk Esterase: x / RBC: x / WBC x   Sq Epi: x / Non Sq Epi: x / Bacteria: x        PT/INR - ( 28 Apr 2025 17:37 )   PT: 10.40 sec;   INR: 0.88 ratio         PTT - ( 28 Apr 2025 17:37 )  PTT:20.7 sec    Lactate Trend        CAPILLARY BLOOD GLUCOSE    POCT Blood Glucose.: 178 mg/dL (28 Apr 2025 16:54)

## 2025-04-28 NOTE — ED PROVIDER NOTE - PATIENT'S PREFERRED PRONOUN
Pt presents to Ed \"feeling like I'm dehydrated. \" Pt reports + Covid x 1 week, received Paxlovid and completed. Pt reports no appetite ans diarrhea x 3 days. Pt reports when trying to eat or drink, it comes out immediately as diarrhea. Pt t further reports nausea but no emesis. Her/She

## 2025-04-28 NOTE — H&P ADULT - NS ATTEND AMEND GEN_ALL_CORE FT
Although patient admitted with dizziness, to me symptoms are suggestive of a TIA (old records reviewed)

## 2025-04-28 NOTE — ED PROVIDER NOTE - CLINICAL SUMMARY MEDICAL DECISION MAKING FREE TEXT BOX
85-year-old female presents ED for evaluation of dizziness, difficulty speaking which has all since resolved.  Patient states his symptoms started 4 hours ago while she was sitting.  Patient states that she also had a pain to left-sided forehead,     Independently interpretted by Merlin Urena DO:  XR interpretation: No cardiopulmonary disease,   EKG interpretation: no JENELLE, NSR    Appropriate medications for patient's presenting complaints were ordered and effects were reassessed.  Patient's external records were reviewed.     Escalation to admission/observation was considered.  At this time, patient requires inpatient hospitalization .

## 2025-04-28 NOTE — H&P ADULT - ASSESSMENT
86 y/o female c/o dizziness room was spinning. The onset was sudden. There were no precipitating facotrs.  Patient states his symptoms started 4 hours ago while she was sitting.  Patient states that she also had a pain to left-sided forehead.  Patient did call her son.  Son was concerned by the way she was speaking and said it was hard for her to get her words out.  On arrival to ED all symptoms have resolved. pt denies chest pain nausea, vomiting.    TIA:   TELE  neuro consult   neuro check  PT   Social service  notify  MD if new S/S  asa  statin    incident finding of thyroid nodule:  thyroid US    continue home meds as per pMD:    HTN:  lisinopril    GERD:  C/W PPI    prophyalxis GI/VTE    case D/W DR Hicks     86 y/o female accompanied by her  at bed side . Pt with a past med hx of HTN, Fibromyalgia c/o dizziness room was spining. The onset was sudden. There were no precipitating facotrs.  Patient states his symptoms started 4 hours ago while she was sitting.  Patient states that she also had a pain to left-sided forehead.  Patient did call her son.  Son was concerned by the way she was speaking and said it was hard for her to get her words out due to the room spinning.  On arrival to ED all symptoms have resolved. pt denies chest pain nausea, vomiting.  TIA:   TELE  neuro consult   neuro check  PT   Social service  notify  MD if new S/S  asa  statin    incident finding of thyroid nodule:  thyroid US    continue home meds as per pMD:    HTN:  lisinopril    fibromyalgia:  prednisone 10mg po QD    GERD:  C/W PPI    prophyalxis GI/VTE    case D/W DR Hicks     86 y/o female accompanied by her  at bed side . Pt with a past med hx of HTN, Fibromyalgia c/o dizziness room was spining. The onset was sudden. There were no precipitating facotrs.  Patient states his symptoms started 4 hours ago while she was sitting.  Patient states that she also had a pain to left-sided forehead.  Patient did call her son.  Son was concerned by the way she was speaking and said it was hard for her to get her words out due to the room spinning.  On arrival to ED all symptoms have resolved. pt denies chest pain nausea, vomiting.  TIA:   TELE  neuro consult   neuro check  PT   Social service  notify  MD if new S/S  asa  statin    incident finding of thyroid nodule:  thyroid US    continue home meds as per pMD:    HTN:  lisinopril    HX odf afib:  only on ASA 81 mg    fibromyalgia:  prednisone 10mg po QD    GERD:  C/W PPI    prophyalxis GI/VTE    case D/W DR Hicks

## 2025-04-29 LAB
A1C WITH ESTIMATED AVERAGE GLUCOSE RESULT: 6.7 % — HIGH (ref 4–5.6)
ANION GAP SERPL CALC-SCNC: 7 MMOL/L — SIGNIFICANT CHANGE UP (ref 7–14)
BUN SERPL-MCNC: 15 MG/DL — SIGNIFICANT CHANGE UP (ref 10–20)
CALCIUM SERPL-MCNC: 9.6 MG/DL — SIGNIFICANT CHANGE UP (ref 8.4–10.5)
CHLORIDE SERPL-SCNC: 103 MMOL/L — SIGNIFICANT CHANGE UP (ref 98–110)
CHOLEST SERPL-MCNC: 226 MG/DL — HIGH
CO2 SERPL-SCNC: 31 MMOL/L — SIGNIFICANT CHANGE UP (ref 17–32)
CREAT SERPL-MCNC: 1 MG/DL — SIGNIFICANT CHANGE UP (ref 0.7–1.5)
EGFR: 56 ML/MIN/1.73M2 — LOW
EGFR: 56 ML/MIN/1.73M2 — LOW
ESTIMATED AVERAGE GLUCOSE: 146 MG/DL — HIGH (ref 68–114)
GLUCOSE SERPL-MCNC: 89 MG/DL — SIGNIFICANT CHANGE UP (ref 70–99)
HCT VFR BLD CALC: 43.4 % — SIGNIFICANT CHANGE UP (ref 37–47)
HDLC SERPL-MCNC: 63 MG/DL — SIGNIFICANT CHANGE UP
HGB BLD-MCNC: 14 G/DL — SIGNIFICANT CHANGE UP (ref 12–16)
LDLC SERPL-MCNC: 136 MG/DL — HIGH
LIPID PNL WITH DIRECT LDL SERPL: 136 MG/DL — HIGH
MCHC RBC-ENTMCNC: 30.3 PG — SIGNIFICANT CHANGE UP (ref 27–31)
MCHC RBC-ENTMCNC: 32.3 G/DL — SIGNIFICANT CHANGE UP (ref 32–37)
MCV RBC AUTO: 93.9 FL — SIGNIFICANT CHANGE UP (ref 81–99)
NONHDLC SERPL-MCNC: 163 MG/DL — HIGH
NRBC BLD AUTO-RTO: 0 /100 WBCS — SIGNIFICANT CHANGE UP (ref 0–0)
PLATELET # BLD AUTO: 207 K/UL — SIGNIFICANT CHANGE UP (ref 130–400)
PMV BLD: 10.3 FL — SIGNIFICANT CHANGE UP (ref 7.4–10.4)
POTASSIUM SERPL-MCNC: 4.5 MMOL/L — SIGNIFICANT CHANGE UP (ref 3.5–5)
POTASSIUM SERPL-SCNC: 4.5 MMOL/L — SIGNIFICANT CHANGE UP (ref 3.5–5)
RBC # BLD: 4.62 M/UL — SIGNIFICANT CHANGE UP (ref 4.2–5.4)
RBC # FLD: 14.4 % — SIGNIFICANT CHANGE UP (ref 11.5–14.5)
SODIUM SERPL-SCNC: 141 MMOL/L — SIGNIFICANT CHANGE UP (ref 135–146)
TRIGL SERPL-MCNC: 151 MG/DL — HIGH
WBC # BLD: 9.55 K/UL — SIGNIFICANT CHANGE UP (ref 4.8–10.8)
WBC # FLD AUTO: 9.55 K/UL — SIGNIFICANT CHANGE UP (ref 4.8–10.8)

## 2025-04-29 PROCEDURE — 99222 1ST HOSP IP/OBS MODERATE 55: CPT

## 2025-04-29 PROCEDURE — 99232 SBSQ HOSP IP/OBS MODERATE 35: CPT

## 2025-04-29 RX ADMIN — Medication 40 MILLIGRAM(S): at 18:10

## 2025-04-29 RX ADMIN — Medication 40 MILLIGRAM(S): at 05:29

## 2025-04-29 RX ADMIN — Medication 5 MILLIGRAM(S): at 05:30

## 2025-04-29 RX ADMIN — PREDNISONE 10 MILLIGRAM(S): 20 TABLET ORAL at 05:29

## 2025-04-29 RX ADMIN — Medication 5 MILLIGRAM(S): at 18:10

## 2025-04-29 RX ADMIN — HEPARIN SODIUM 5000 UNIT(S): 1000 INJECTION INTRAVENOUS; SUBCUTANEOUS at 05:33

## 2025-04-29 RX ADMIN — Medication 81 MILLIGRAM(S): at 11:59

## 2025-04-29 RX ADMIN — HEPARIN SODIUM 5000 UNIT(S): 1000 INJECTION INTRAVENOUS; SUBCUTANEOUS at 18:11

## 2025-04-29 NOTE — PATIENT PROFILE ADULT - FALL HARM RISK - HARM RISK INTERVENTIONS
Assistance with ambulation/Assistance OOB with selected safe patient handling equipment/Communicate Risk of Fall with Harm to all staff/Discuss with provider need for PT consult/Monitor gait and stability/Provide patient with walking aids - walker, cane, crutches/Reinforce activity limits and safety measures with patient and family/Sit up slowly, dangle for a short time, stand at bedside before walking/Tailored Fall Risk Interventions/Use of alarms - bed, chair and/or voice tab/Visual Cue: Yellow wristband and red socks/Bed in lowest position, wheels locked, appropriate side rails in place/Call bell, personal items and telephone in reach/Instruct patient to call for assistance before getting out of bed or chair/Non-slip footwear when patient is out of bed/[place] to call system/Physically safe environment - no spills, clutter or unnecessary equipment/Purposeful Proactive Rounding/Room/bathroom lighting operational, light cord in reach

## 2025-04-29 NOTE — CONSULT NOTE ADULT - ASSESSMENT
86yo RHF PMH polymyalgia rheumatica, neuropathy presented to ED for episode of room spinning dizziness, that has completely resolved. CTH reports no acute changes. CTA reports no stenosis. Exam without focal neuro deficits. Etiology of symptoms likely related to reduction in prednisone, r/o neurovascular.  84yo RHF PMH polymyalgia rheumatica, neuropathy presented to ED for episode of room spinning dizziness, that has completely resolved. CTH reports no acute changes. CTA reports no stenosis. Exam without focal neuro deficits. Etiology of symptoms likely related to reduction in prednisone.    Recommendations  - No further inpatient neuro workup  - F/u outpatient ENT if symptoms recur  - Medical management per primary team    Discussed with attending Dr Calvin  84yo RHF PMH polymyalgia rheumatica, neuropathy presented to ED for episode of room spinning dizziness, that has completely resolved. CTH reports no acute changes. CTA reports no stenosis. Exam without focal neuro deficits. Etiology of symptoms likely related to reduction in prednisone.    Recommendations  - f/u MRI Brain NC  - if MR (-), may d/c w/ outpt f/u with ENT if symptoms recure  - meclizine 25 mg TID PRN if symptoms recur    Discussed with attending Dr Calvin

## 2025-04-29 NOTE — CONSULT NOTE ADULT - NS ATTEND AMEND GEN_ALL_CORE FT
Pt w/ acute persistent vertigo x 30 minutes a/w N/V worse with movement not a/w incoordination or visual changes.  Suspect BPPV possibly predisposed from recent travel abroad currently nonfocal recommend f/u MRI Brain r/o posterior circulation pathology.  Rest of recommendations as above.

## 2025-04-29 NOTE — CONSULT NOTE ADULT - SUBJECTIVE AND OBJECTIVE BOX
NEUROLOGY CONSULT    HPI: 86yo RHF PMH polymyalgia rheumatica, neuropathy presented to ED for episode of dizziness. She was sitting on her recliner about to take a nap when she got a sudden sensation of "room spinning". She is unsure if head movement made it worse because she was spinning so significantly. She did not try to stand up because she felt as if she would fall. Episode lasted around 30minutes, and was associated with nausea and vomiting. She called her son and could not speak due to the significant symptoms, but denies any slurred speech. No recent illness. No tinnitus/hearing loss/ear fullness.  Dizziness has now completely resolved; she has been able to walk to and from the restroom without experiencing any symptoms.     At the beginning of the month she was on prednisone 20 but since then rheumatologist has had her taper it. She last decreased dose on the  and is currently on 10mg. She notes she usually gets symptoms when she decreases her prednisone, but she has not had vertigo before. In addition, she came back from a trip to [place] a few days ago.    At baseline, she uses a cane/walker due to chronic knee injury.          MEDICATIONS  Home Medications:  Centrum Silver:  (2025 20:01)  enalapril 5 mg oral tablet: 1 tab(s) orally 2 times a day (2025 20:01)  Myrbetriq 25 mg oral tablet, extended release: 1 tab(s) orally (2025 20:01)  naproxen 500 mg oral tablet: 1 tab(s) orally 2 times a day (2025 20:01)  nitrofurantoin macrocrystals-monohydrate 100 mg oral capsule: 1 cap(s) orally (2025 20:01)  prednisoLONE 10 mg oral tablet, disintegratin tab(s) orally once a day (2025 20:01)    MEDICATIONS  (STANDING):  aspirin enteric coated 81 milliGRAM(s) Oral daily  atorvastatin 80 milliGRAM(s) Oral at bedtime  chlorhexidine 2% Cloths 1 Application(s) Topical <User Schedule>  enalapril 5 milliGRAM(s) Oral two times a day  heparin   Injectable 5000 Unit(s) SubCutaneous every 12 hours  naproxen 500 milliGRAM(s) Oral two times a day  pantoprazole    Tablet 40 milliGRAM(s) Oral two times a day  predniSONE   Tablet 10 milliGRAM(s) Oral daily    MEDICATIONS  (PRN):  acetaminophen     Tablet .. 650 milliGRAM(s) Oral every 6 hours PRN Temp greater or equal to 38C (100.4F), Mild Pain (1 - 3)  aluminum hydroxide/magnesium hydroxide/simethicone Suspension 30 milliLiter(s) Oral every 4 hours PRN Dyspepsia  meclizine 25 milliGRAM(s) Oral every 8 hours PRN Dizziness  melatonin 3 milliGRAM(s) Oral at bedtime PRN Insomnia  ondansetron Injectable 4 milliGRAM(s) IV Push every 4 hours PRN Nausea and/or Vomiting      FAMILY HISTORY:  FH: renal failure (Father)    FH: lung cancer (Mother)    FH: kidney cancer (Mother)      SOCIAL HISTORY: negative for tobacco, alcohol, or illicit drug use.    Allergies    sulfADIAZINE (Rash)  latex (Rash)  adhesives (Rash)  sulfa drugs (Rash)      GEN: NAD, pleasant, cooperative    NEURO:   MENTAL STATUS: AAOx3  LANG/SPEECH: Fluent, intact naming, repetition & comprehension  CRANIAL NERVES:  II: Pupils equal round and reactive, no RAPD, normal visual fields  III, IV, VI: EOM intact, no gaze preference or deviation  V: normal  VII: no facial asymmetry  VIII: normal hearing to speech  MOTOR: 5/5 in both upper and lower extremities  REFLEXES: downgoing toes   SENSORY: Normal to light touch. No neglect   COORD: Normal finger to nose and heel to shin. No cerebellar overshoot       LABS:                        14.0   9.55  )-----------( 207      ( 2025 05:37 )             43.4         141  |  103  |  15  ----------------------------<  89  4.5   |  31  |  1.0    Ca    9.6      2025 05:37    TPro  6.7  /  Alb  4.0  /  TBili  0.4  /  DBili  x   /  AST  52[H]  /  ALT  31  /  AlkPhos  32      Hemoglobin A1C:   Vitamin B12   PT/INR - ( 2025 17:37 )   PT: 10.40 sec;   INR: 0.88 ratio         PTT - ( 2025 17:37 )  PTT:20.7 sec  CAPILLARY BLOOD GLUCOSE      POCT Blood Glucose.: 178 mg/dL (2025 16:54)      Urinalysis Basic - ( 2025 05:37 )    Color: x / Appearance: x / SG: x / pH: x  Gluc: 89 mg/dL / Ketone: x  / Bili: x / Urobili: x   Blood: x / Protein: x / Nitrite: x   Leuk Esterase: x / RBC: x / WBC x   Sq Epi: x / Non Sq Epi: x / Bacteria: x            RADIOLOGY  < from: CT Brain Stroke Protocol (25 @ 17:19) >  IMPRESSION:    No acute intracranial pathology.    Findings discussed with Ivelisse PIÑA by radiology resident Adriana   at 5:14 PM.    --- End of Report ---    < end of copied text >    < from: CT Angio Neck Stroke Protocol w/ IV Cont (25 @ 17:20) >  IMPRESSION:    CT PERFUSION:  No perfusion deficits to suggest areas of completed infarction or at risk   territory.    CTA HEAD/NECK:  No large vessel occlusion, aneurysm, or vascular malformation.  Thyroid nodule recommend thyroid ultrasound.    --- End of Report ---    < end of copied text >            
HPI: 84 y/o female accompanied by her son at bed side . Pt with a past med hx of HTN, Fibromyalgia c/o dizziness room was spining. The onset was sudden. There were no precipitating facotrs.  Patient states his symptoms started 4 hours ago while she was sitting.  Patient states that she also had a pain to left-sided forehead.  Patient did call her son.  Son was concerned by the way she was speaking and said it was hard for her to get her words out due to the room spinning.  On arrival to ED all symptoms have resolved. pt denies chest pain nausea, vomiting. ptn seen and exam  no  new  c/o  doing  well  ptn  labs  , imaging and  medical  notes are  appreciated  and  reviewed  .  PTN  REFERRED TO ACUTE  REHAB  FOR  EVAL AND  TX   PAST MEDICAL & SURGICAL HISTORY:  Afib  AGE 72 X1 EPISODE      Early stage skin cancer  SQAMOUS AND BASAL CELL SKIN CA      Asymptomatic hypertension      Peripheral neuropathy      Mitral regurgitation      OA (osteoarthritis)      Low back pain  BULGING DISCS LUMBAR SPINE      Murmur, heart      Obesity with body mass index (BMI) of 30.0 to 39.9      History of medical problems  hiatal hernia, tia no residual      Fibromyalgia      History of cholecystectomy      H/O abdominal hysterectomy      History of surgery  REMOVAL OF RIGHT ANKLE SKIN CANCER. right knee miniscus repair      History of tonsillectomy          Hospital Course:    TODAY'S SUBJECTIVE & REVIEW OF SYMPTOMS:     Constitutional WNL   Cardio WNL   Resp WNL   GI WNL  Heme WNL  Endo WNL  Skin WNL  MSK WNL  Neuro WNL  Cognitive WNL  Psych WNL      MEDICATIONS  (STANDING):  aspirin enteric coated 81 milliGRAM(s) Oral daily  atorvastatin 80 milliGRAM(s) Oral at bedtime  chlorhexidine 2% Cloths 1 Application(s) Topical <User Schedule>  enalapril 5 milliGRAM(s) Oral two times a day  heparin   Injectable 5000 Unit(s) SubCutaneous every 12 hours  naproxen 500 milliGRAM(s) Oral two times a day  pantoprazole    Tablet 40 milliGRAM(s) Oral two times a day  predniSONE   Tablet 10 milliGRAM(s) Oral daily    MEDICATIONS  (PRN):  acetaminophen     Tablet .. 650 milliGRAM(s) Oral every 6 hours PRN Temp greater or equal to 38C (100.4F), Mild Pain (1 - 3)  aluminum hydroxide/magnesium hydroxide/simethicone Suspension 30 milliLiter(s) Oral every 4 hours PRN Dyspepsia  meclizine 25 milliGRAM(s) Oral every 8 hours PRN Dizziness  melatonin 3 milliGRAM(s) Oral at bedtime PRN Insomnia  ondansetron Injectable 4 milliGRAM(s) IV Push every 4 hours PRN Nausea and/or Vomiting      FAMILY HISTORY:  FH: renal failure (Father)    FH: lung cancer (Mother)    FH: kidney cancer (Mother)        Allergies    sulfADIAZINE (Rash)  latex (Rash)  adhesives (Rash)  sulfa drugs (Rash)    Intolerances        SOCIAL HISTORY:    [  ] Etoh  [  ] Smoking  [  ] Substance abuse     Home Environment:  [ x ] Home Alone  [  ] Lives with Family  [  ] Home Health Aid    Dwelling:  [  ] Apartment  [ x ] Private House  [  ] Adult Home  [  ] Skilled Nursing Facility      [  ] Short Term  [  ] Long Term  [ x ] Stairs       Elevator [  ]    FUNCTIONAL STATUS PTA: (Check all that apply)  Ambulation: [ x  ]Independent    [  ] Dependent     [  ] Non-Ambulatory  Assistive Device: [ x ] SA Cane  [  ]  Q Cane  [  ] Walker  [  ]  Wheelchair  ADL : [ x] Independent  [  ]  Dependent       Vital Signs Last 24 Hrs  T(C): 36.7 (29 Apr 2025 04:59), Max: 36.7 (29 Apr 2025 04:59)  T(F): 98 (29 Apr 2025 04:59), Max: 98 (29 Apr 2025 04:59)  HR: 62 (29 Apr 2025 04:59) (60 - 84)  BP: 125/60 (29 Apr 2025 04:59) (125/60 - 182/68)  BP(mean): --  RR: 18 (29 Apr 2025 04:59) (18 - 20)  SpO2: 96% (29 Apr 2025 04:59) (94% - 100%)    Parameters below as of 28 Apr 2025 22:36  Patient On (Oxygen Delivery Method): room air          PHYSICAL EXAM: Alert & Oriented X3  GENERAL: NAD, well-groomed, well-developed  HEAD:  Atraumatic, Normocephalic  EYES: EOMI, PERRLA, conjunctiva and sclera clear  NECK: Supple, No JVD, Normal thyroid  CHEST/LUNG: Clear to percussion bilaterally; No rales, rhonchi, wheezing, or rubs  HEART: Regular rate and rhythm; No murmurs, rubs, or gallops  ABDOMEN: Soft, Nontender, Nondistended; Bowel sounds present  EXTREMITIES:  2+ Peripheral Pulses, No clubbing, cyanosis, or edema    NERVOUS SYSTEM:  Cranial Nerves 2-12 intact [ x ] Abnormal  [  ]  ROM: WFL all extremities [ x ]  Abnormal [  ]  Motor Strength: WFL all extremities  [ x ]  Abnormal [  ]  Sensation: intact to light touch [ x ] Abnormal [  ]  Reflexes: Symmetric [  x]  Abnormal [  ]    FUNCTIONAL STATUS:  Bed Mobility: Independent [  ]  Supervision [  ]  Needs Assistance [  ]  N/A [ x ]  Transfers: Independent [  ]  Supervision [  ]  Needs Assistance [  ]  N/A [ x ]   Ambulation: Independent [  ]  Supervision [  ]  Needs Assistance [  ]  N/A [x  ]  ADL: Independent [  ] Requires Assistance [  ] N/A [ x ]  SEE PT/OT IE NOTES    LABS:                        14.0   9.55  )-----------( 207      ( 29 Apr 2025 05:37 )             43.4     04-29    141  |  103  |  15  ----------------------------<  89  4.5   |  31  |  1.0    Ca    9.6      29 Apr 2025 05:37    TPro  6.7  /  Alb  4.0  /  TBili  0.4  /  DBili  x   /  AST  52[H]  /  ALT  31  /  AlkPhos  32  04-28    PT/INR - ( 28 Apr 2025 17:37 )   PT: 10.40 sec;   INR: 0.88 ratio         PTT - ( 28 Apr 2025 17:37 )  PTT:20.7 sec  Urinalysis Basic - ( 29 Apr 2025 05:37 )    Color: x / Appearance: x / SG: x / pH: x  Gluc: 89 mg/dL / Ketone: x  / Bili: x / Urobili: x   Blood: x / Protein: x / Nitrite: x   Leuk Esterase: x / RBC: x / WBC x   Sq Epi: x / Non Sq Epi: x / Bacteria: x        RADIOLOGY & ADDITIONAL STUDIES:< from: CT Brain Perfusion Maps Stroke (04.28.25 @ 17:20) >  CT PERFUSION:  No perfusion deficits to suggest areas of completed infarction or at risk   territory.    CTA HEAD/NECK:  No large vessel occlusion, aneurysm, or vascular malformation.  Thyroid nodule recommend thyroid ultrasound.    < end of copied text >      Assesment:

## 2025-04-29 NOTE — CONSULT NOTE ADULT - TIME BILLING
above.  Total time spent includes but is not limited to personal review of patient chart, available results, collateral information (if necessary) and examination of patient at bedside and time spent formulating assessment and recommendations independent of teaching time.

## 2025-04-29 NOTE — PROGRESS NOTE ADULT - NS ATTEST RISK PROBLEM GEN_ALL_CORE FT
Assessment of the patient, review of imaging and results, discussions with the patient and specialists

## 2025-04-29 NOTE — CONSULT NOTE ADULT - ASSESSMENT
IMPRESSION: Rehab of 86 y/o f  rehab  for  syncope  r/o  cva      PRECAUTIONS: [  ] Cardiac  [  ] Respiratory  [  ] Seizures [  ] Contact Isolation  [  ] Droplet Isolation  [ FALL ] Other    Weight Bearing Status:     RECOMMENDATION:    Out of Bed to Chair     DVT/Decubiti Prophylaxis    REHAB PLAN:     [ x  ] Bedside P/T 3-5 times a week   [  x ]   Bedside O/T  2-3 times a week             [   ] No Rehab Therapy Indicated                   [   ]  Speech Therapy   Conditioning/ROM                                    ADL  Bed Mobility                                               Conditioning/ROM  Transfers                                                     Bed Mobility  Sitting /Standing Balance                         Transfers                                        Gait Training                                               Sitting/Standing Balance  Stair Training [   ]Applicable                    Home equipment Eval                                                                        Splinting  [   ] Only      GOALS:   ADL   [x   ]   Independent                    Transfers  [ x  ] Independent                          Ambulation  [  x ] Independent     [    ] With device                            [   ]  CG                                                         [   ]  CG                                                                  [   ] CG                            [    ] Min A                                                   [   ] Min A                                                              [   ] Min  A          DISCHARGE PLAN:   [   ]  Good candidate for Intensive Rehabilitation/Hospital based-4A [facility]                                             Will tolerate 3hrs Intensive Rehab Daily                                       [ x?   ]  Short Term Rehab in Skilled Nursing Facility                                       [  xx  ]  Home with Outpatient or VN services                                         [    ]  Possible Candidate for Intensive Hospital based Rehab

## 2025-04-30 PROCEDURE — 99232 SBSQ HOSP IP/OBS MODERATE 35: CPT

## 2025-04-30 PROCEDURE — 70551 MRI BRAIN STEM W/O DYE: CPT | Mod: 26

## 2025-04-30 RX ORDER — ATORVASTATIN CALCIUM 80 MG/1
40 TABLET, FILM COATED ORAL AT BEDTIME
Refills: 0 | Status: DISCONTINUED | OUTPATIENT
Start: 2025-04-30 | End: 2025-05-01

## 2025-04-30 RX ADMIN — PREDNISONE 10 MILLIGRAM(S): 20 TABLET ORAL at 05:28

## 2025-04-30 RX ADMIN — Medication 5 MILLIGRAM(S): at 05:28

## 2025-04-30 RX ADMIN — Medication 1 APPLICATION(S): at 05:33

## 2025-04-30 RX ADMIN — Medication 81 MILLIGRAM(S): at 11:54

## 2025-04-30 RX ADMIN — HEPARIN SODIUM 5000 UNIT(S): 1000 INJECTION INTRAVENOUS; SUBCUTANEOUS at 05:29

## 2025-04-30 RX ADMIN — Medication 40 MILLIGRAM(S): at 21:37

## 2025-04-30 RX ADMIN — Medication 40 MILLIGRAM(S): at 05:28

## 2025-04-30 RX ADMIN — Medication 5 MILLIGRAM(S): at 18:02

## 2025-04-30 NOTE — PROGRESS NOTE ADULT - ASSESSMENT
BJ FINN 85y Female  MRN#: [id number]         SUBJECTIVE  Patient is a 85y old Female who presents with a chief complaint of dizziness (29 Apr 2025 11:07)  Currently admitted to medicine with the primary diagnosis of Dizziness      Today is hospital day 2d, and this morning she is feeling well, vertigo resolved           OBJECTIVE  PAST MEDICAL & SURGICAL HISTORY  Afib  AGE 72 X1 EPISODE    Early stage skin cancer  SQAMOUS AND BASAL CELL SKIN CA    Asymptomatic hypertension    Peripheral neuropathy    Mitral regurgitation    OA (osteoarthritis)    Low back pain  BULGING DISCS LUMBAR SPINE    Murmur, heart    Obesity with body mass index (BMI) of 30.0 to 39.9    History of medical problems  hiatal hernia, tia no residual    Fibromyalgia    History of cholecystectomy    H/O abdominal hysterectomy    History of surgery  REMOVAL OF RIGHT ANKLE SKIN CANCER. right knee miniscus repair    History of tonsillectomy      ALLERGIES:  sulfADIAZINE (Rash)  latex (Rash)  adhesives (Rash)  sulfa drugs (Rash)    MEDICATIONS:  STANDING MEDICATIONS  aspirin enteric coated 81 milliGRAM(s) Oral daily  atorvastatin 80 milliGRAM(s) Oral at bedtime  chlorhexidine 2% Cloths 1 Application(s) Topical <User Schedule>  enalapril 5 milliGRAM(s) Oral two times a day  heparin   Injectable 5000 Unit(s) SubCutaneous every 12 hours  naproxen 500 milliGRAM(s) Oral two times a day  pantoprazole    Tablet 40 milliGRAM(s) Oral two times a day  predniSONE   Tablet 10 milliGRAM(s) Oral daily    PRN MEDICATIONS  acetaminophen     Tablet .. 650 milliGRAM(s) Oral every 6 hours PRN  aluminum hydroxide/magnesium hydroxide/simethicone Suspension 30 milliLiter(s) Oral every 4 hours PRN  meclizine 25 milliGRAM(s) Oral every 8 hours PRN  melatonin 3 milliGRAM(s) Oral at bedtime PRN  ondansetron Injectable 4 milliGRAM(s) IV Push every 4 hours PRN      VITAL SIGNS: Last 24 Hours  T(C): 37.1 (29 Apr 2025 16:49), Max: 37.1 (29 Apr 2025 16:49)  T(F): 98.7 (29 Apr 2025 16:49), Max: 98.7 (29 Apr 2025 16:49)  HR: 58 (29 Apr 2025 16:49) (58 - 72)  BP: 125/80 (29 Apr 2025 16:49) (104/63 - 182/68)  BP(mean): --  RR: 18 (29 Apr 2025 16:49) (18 - 19)  SpO2: 94% (29 Apr 2025 16:49) (93% - 96%)    LABS:                        14.0   9.55  )-----------( 207      ( 29 Apr 2025 05:37 )             43.4     04-29    141  |  103  |  15  ----------------------------<  89  4.5   |  31  |  1.0    Ca    9.6      29 Apr 2025 05:37    TPro  6.7  /  Alb  4.0  /  TBili  0.4  /  DBili  x   /  AST  52[H]  /  ALT  31  /  AlkPhos  32  04-28    PT/INR - ( 28 Apr 2025 17:37 )   PT: 10.40 sec;   INR: 0.88 ratio         PTT - ( 28 Apr 2025 17:37 )  PTT:20.7 sec  Urinalysis Basic - ( 29 Apr 2025 05:37 )    Color: x / Appearance: x / SG: x / pH: x  Gluc: 89 mg/dL / Ketone: x  / Bili: x / Urobili: x   Blood: x / Protein: x / Nitrite: x   Leuk Esterase: x / RBC: x / WBC x   Sq Epi: x / Non Sq Epi: x / Bacteria: x                PHYSICAL EXAM:    GENERAL: NAD, well-developed, AAOx3  HEENT:  Atraumatic, Normocephalic. EOMI, PERRLA, conjunctiva and sclera clear, No JVD  PULMONARY: Clear to auscultation bilaterally; No wheeze  CARDIOVASCULAR: Regular rate and rhythm; No murmurs, rubs, or gallops  GASTROINTESTINAL: Soft, Nontender, Nondistended; Bowel sounds present  MUSCULOSKELETAL:  2+ Peripheral Pulses, No clubbing, cyanosis, or edema  NEUROLOGY: non-focal  SKIN: No rashes or lesions        84 y/o female accompanied by her  at bed side . Pt with a past med hx of HTN, Fibromyalgia c/o dizziness room was spinning The onset was sudden. There were no precipitating facotrs.  Patient states his symptoms started 4 hours ago while she was sitting.  Patient states that she also had a pain to left-sided forehead.  Patient did call her son.  Son was concerned by the way she was speaking and said it was hard for her to get her words out due to the room spinning.  On arrival to ED all symptoms have resolved. pt denies chest pain nausea, vomiting.    TIA/vertigo  monitor  on TELE  neuro consulted  cont  neuro check  PT   Social service  asa,statin  CTA head and neck: neg for stenosis  speech is abck to baseline  -vertigo resolved  f/u MRI     incident finding of thyroid nodule:  thyroid US    continue home meds as per pMD:    HTN:  lisinopril    HX odf afib:  only on ASA 81 mg    fibromyalgia:  prednisone 10mg po QD(being tapered off slowely outpatient )    GERD:  C/W PPI    prophyalxis GI/VTE  
 86 yo female with h/o HTN, fibromyalgia presented with vertigo. Has resolved since admission. Evaluated by neurology, pending MRI brain.    Vertigo  -stroke workup has been negative so far  -pending MRI brain  -neurology recommendation noted    Fibromyalgia  -on prednisone  -outpt follow up    HTN  -on lisinopril    Handoff: pending brain MRI

## 2025-04-30 NOTE — PHYSICAL THERAPY INITIAL EVALUATION ADULT - GAIT DEVIATIONS NOTED, PT EVAL
stooped posture, dec heel strike/pushoff , slightly increased toe-out/decreased ace/decreased step length/decreased weight-shifting ability

## 2025-04-30 NOTE — OCCUPATIONAL THERAPY INITIAL EVALUATION ADULT - GENERAL OBSERVATIONS, REHAB EVAL
10:47-11:10 Chart reviewed, ok to treat by Occupational Therapist as confirmed by RN Dom, Pt received seated in bedside chair (+) tele (+) heplock (+) chair alarm in NAD. Pt in agreement with OT IE.

## 2025-04-30 NOTE — PHYSICAL THERAPY INITIAL EVALUATION ADULT - GENERAL OBSERVATIONS, REHAB EVAL
13:15-13:40 Chart reviewed. Pt encountered sitting in chair,  may be seen by Physical Therapist as confirmed with Nurse.  Patient denied pain and ready to walk now; +tele/ IV lock RUE/ family at bedside

## 2025-04-30 NOTE — PROGRESS NOTE ADULT - SUBJECTIVE AND OBJECTIVE BOX
CHIEF COMPLAINT:    Patient is a 85y old  Female who presents with a chief complaint of dizziness (29 Apr 2025 19:36)      INTERVAL HPI/OVERNIGHT EVENTS:    Patient seen and examined at bedside with family members present. Pt reported vertigo has resolved, no new complaints.     Medications:  Standing  aspirin enteric coated 81 milliGRAM(s) Oral daily  atorvastatin 40 milliGRAM(s) Oral at bedtime  chlorhexidine 2% Cloths 1 Application(s) Topical <User Schedule>  enalapril 5 milliGRAM(s) Oral two times a day  heparin   Injectable 5000 Unit(s) SubCutaneous every 12 hours  naproxen 500 milliGRAM(s) Oral two times a day  pantoprazole    Tablet 40 milliGRAM(s) Oral before breakfast  predniSONE   Tablet 10 milliGRAM(s) Oral daily    PRN Meds  acetaminophen     Tablet .. 650 milliGRAM(s) Oral every 6 hours PRN  aluminum hydroxide/magnesium hydroxide/simethicone Suspension 30 milliLiter(s) Oral every 4 hours PRN  meclizine 25 milliGRAM(s) Oral every 8 hours PRN  melatonin 3 milliGRAM(s) Oral at bedtime PRN  ondansetron Injectable 4 milliGRAM(s) IV Push every 4 hours PRN    Vital Signs:    T(F): 97.3 (04-30-25 @ 14:00), Max: 98.7 (04-29-25 @ 16:49)  HR: 78 (04-30-25 @ 14:00) (55 - 78)  BP: 124/65 (04-30-25 @ 14:00) (122/76 - 125/80)  RR: 18 (04-30-25 @ 14:00) (18 - 18)  SpO2: 96% (04-30-25 @ 14:00) (94% - 96%)  I&O's Summary    Daily Height in cm: 167.64 (29 Apr 2025 16:49)    Daily   CAPILLARY BLOOD GLUCOSE    PHYSICAL EXAM:  GENERAL:  NAD  SKIN: No rashes or lesions  HEENT: Atraumatic. Normocephalic. Anicteric  NECK:  No JVD.   PULMONARY: Clear to ausculation bilaterally. No wheezing. No rales  CVS: RRR, normal S1, S2  ABDOMEN/GI: Soft, Nontender, Nondistended; Bowel sounds are present  EXTREMITIES:  No edema B/L LE.    LABS:                        14.0   9.55  )-----------( 207      ( 29 Apr 2025 05:37 )             43.4     04-29    141  |  103  |  15  ----------------------------<  89  4.5   |  31  |  1.0    Ca    9.6      29 Apr 2025 05:37    TPro  6.7  /  Alb  4.0  /  TBili  0.4  /  DBili  x   /  AST  52[H]  /  ALT  31  /  AlkPhos  32  04-28    PT/INR - ( 28 Apr 2025 17:37 )   PT: 10.40 sec;   INR: 0.88 ratio      PTT - ( 28 Apr 2025 17:37 )  PTT:20.7 sec    RADIOLOGY & ADDITIONAL TESTS:  Imaging or report Personally Reviewed:  [ ] YES  [ ] NO -->no new images

## 2025-04-30 NOTE — OCCUPATIONAL THERAPY INITIAL EVALUATION ADULT - LIVES WITH, PROFILE
in a private house 9 steps to enter with bilateral handrails then level inside; (+) bathtub with shower chair and grab bars (+) grab bars around toilet/alone

## 2025-04-30 NOTE — PHYSICAL THERAPY INITIAL EVALUATION ADULT - PERTINENT HX OF CURRENT PROBLEM, REHAB EVAL
84 y/o male admitted with diagnosis of Dizziness and giddiness, presented to ED for episode of dizziness which started about 4 hours prior to ED arrival on 4/28/25; also with pain on (L) forehead, called son who was concerned about patient's speech, symptoms resolved upon ED arrival, no acute pathology on Head CT, awaiting MRI, seen by Neurology

## 2025-04-30 NOTE — SWALLOW BEDSIDE ASSESSMENT ADULT - SWALLOW EVAL: DIAGNOSIS
+toleration observed without overt s/s of aspiration/penetration for regular consistency and thin liquids
+toleration observed without overt s/s of aspiration/penetration for regular consistency and thin liquids

## 2025-04-30 NOTE — OCCUPATIONAL THERAPY INITIAL EVALUATION ADULT - PERTINENT HX OF CURRENT PROBLEM, REHAB EVAL
84yo RHF PMH polymyalgia rheumatica, neuropathy presented to ED for episode of dizziness. She was sitting on her recliner about to take a nap when she got a sudden sensation of "room spinning". She is unsure if head movement made it worse because she was spinning so significantly. She did not try to stand up because she felt as if she would fall. Episode lasted around 30minutes, and was associated with nausea and vomiting. She called her son and could not speak due to the significant symptoms, but denies any slurred speech. No recent illness. No tinnitus/hearing loss/ear fullness.  Dizziness has now completely resolved; she has been able to walk to and from the restroom without experiencing any symptoms.     At the beginning of the month she was on prednisone 20 but since then rheumatologist has had her taper it. She last decreased dose on the 17th and is currently on 10mg. She notes she usually gets symptoms when she decreases her prednisone, but she has not had vertigo before. In addition, she came back from a trip to [place] a few days ago.    CTH reports no acute changes. CTA reports no stenosis. Exam without focal neuro deficits. MRI Pending

## 2025-04-30 NOTE — SWALLOW BEDSIDE ASSESSMENT ADULT - SLP PERTINENT HISTORY OF CURRENT PROBLEM
84 y/o female accompanied by her  at bed side . Pt with a past med hx of HTN, Fibromyalgia c/o dizziness room was spinning. CTH No acute intracranial pathology. CT PERFUSION & CTA HEAD/NECK negative. Admitted for workup.
84 y/o female accompanied by her  at bed side . Pt with a past med hx of HTN, Fibromyalgia c/o dizziness room was spinning. CTH No acute intracranial pathology. CT PERFUSION & CTA HEAD/NECK negative. Admitted for workup.

## 2025-04-30 NOTE — SWALLOW BEDSIDE ASSESSMENT ADULT - SLP GENERAL OBSERVATIONS
Awake, alert, O2 via room air, AOx4, speech is clear, expressive/receptive language intact.
Awake, alert,OOBTC O2 via room air, AOx4, speech is clear, expressive/receptive language intact.

## 2025-05-01 ENCOUNTER — TRANSCRIPTION ENCOUNTER (OUTPATIENT)
Age: 85
End: 2025-05-01

## 2025-05-01 VITALS
SYSTOLIC BLOOD PRESSURE: 103 MMHG | OXYGEN SATURATION: 96 % | TEMPERATURE: 98 F | RESPIRATION RATE: 18 BRPM | HEART RATE: 64 BPM | DIASTOLIC BLOOD PRESSURE: 67 MMHG

## 2025-05-01 PROCEDURE — 99239 HOSP IP/OBS DSCHRG MGMT >30: CPT

## 2025-05-01 RX ORDER — ASPIRIN 325 MG
1 TABLET ORAL
Qty: 0 | Refills: 0 | DISCHARGE
Start: 2025-05-01

## 2025-05-01 RX ORDER — MECLIZINE HCL 12.5 MG
1 TABLET ORAL
Qty: 20 | Refills: 0
Start: 2025-05-01

## 2025-05-01 RX ADMIN — Medication 1 APPLICATION(S): at 06:03

## 2025-05-01 RX ADMIN — Medication 40 MILLIGRAM(S): at 06:04

## 2025-05-01 RX ADMIN — PREDNISONE 10 MILLIGRAM(S): 20 TABLET ORAL at 06:04

## 2025-05-01 RX ADMIN — Medication 5 MILLIGRAM(S): at 06:04

## 2025-05-01 NOTE — DISCHARGE NOTE PROVIDER - HOSPITAL COURSE
84 yo female with h/o HTN, fibromyalgia presented with vertigo. Has resolved since admission.     # Vertigo  -stroke workup has been negative so far  - MRI brain: negative study  -neurology recommendations were noted    # Fibromyalgia  -on prednisone  -outpt follow up    # HTN  -on lisinopril     84 y/o female accompanied by her  at bed side . Pt with a past med hx of HTN, Fibromyalgia c/o dizziness room was spining. The onset was sudden. There were no precipitating facotrs.  Patient states his symptoms started 4 hours ago while she was sitting.  Patient states that she also had a pain to left-sided forehead.  Patient did call her son.  Son was concerned by the way she was speaking and said it was hard for her to get her words out due to the room spinning.  On arrival to ED all symptoms have resolved. pt denies chest pain nausea, vomiting.    # Vertigo  -stroke workup has been negative so far  -MRI brain: negative study  -neurology recommendations were noted  -meclizine as needed, outpt follow up    # Fibromyalgia  -on prednisone  -outpt follow up    # HTN  -on lisinopril

## 2025-05-01 NOTE — DISCHARGE NOTE NURSING/CASE MANAGEMENT/SOCIAL WORK - PATIENT PORTAL LINK FT
You can access the FollowMyHealth Patient Portal offered by [facility] by registering at the following website: [email or web address]. By joining [employer]’s FollowMyHealth portal, you will also be able to view your health information using other applications (apps) compatible with our system.

## 2025-05-01 NOTE — DISCHARGE NOTE NURSING/CASE MANAGEMENT/SOCIAL WORK - FINANCIAL ASSISTANCE
[facility] provides services at a reduced cost to those who are determined to be eligible through [facility]’s financial assistance program. Information regarding [facility]’s financial assistance program can be found by going to [email or web address] or by calling [phone].

## 2025-05-01 NOTE — DISCHARGE NOTE PROVIDER - NSDCCPCAREPLAN_GEN_ALL_CORE_FT
PRINCIPAL DISCHARGE DIAGNOSIS  Diagnosis: Dizziness  Assessment and Plan of Treatment: Diagnosis: Vertigo, you were discharged home on Meclizine for dizziness which you can take a needed. Pleae follow up with your PMD in 2 weeks.

## 2025-05-01 NOTE — DISCHARGE NOTE PROVIDER - ATTENDING DISCHARGE PHYSICAL EXAMINATION:
T(C): 36.6 (05-01-25 @ 05:01), Max: 36.6 (05-01-25 @ 05:01)  HR: 64 (05-01-25 @ 05:01) (64 - 78)  BP: 103/67 (05-01-25 @ 05:01) (103/67 - 126/75)  RR: 18 (05-01-25 @ 05:01) (18 - 18)  SpO2: 96% (05-01-25 @ 05:01) (92% - 96%)    CONSTITUTIONAL: Well groomed, no apparent distress  EYES: PERRLA and symmetric, EOMI  RESP: No respiratory distress, no use of accessory muscles; CTA b/l, no WRR  CV: RRR, +S1S2, no MRG; no JVD; no peripheral edema  GI: Soft, NT, ND, no rebound, no guarding; no palpable masses; no hepatosplenomegaly; no hernia palpated  MSK: Normal gait; No edema on b/l LE  NEURO: CN II-XII intact; no focal deficit  PSYCH: Appropriate insight/judgment; A+O x 3, mood and affect appropriate

## 2025-05-01 NOTE — DISCHARGE NOTE PROVIDER - NSDCMRMEDTOKEN_GEN_ALL_CORE_FT
acetaminophen 325 mg oral tablet: 2 tab(s) orally every 6 hours MDD: 8  aspirin 81 mg oral delayed release tablet: 1 tab(s) orally once a day  enalapril 5 mg oral tablet: 1 tab(s) orally 2 times a day  meclizine 25 mg oral tablet: 1 tab(s) orally every 8 hours as needed for Dizziness  Myrbetriq 25 mg oral tablet, extended release: 1 tab(s) orally  naproxen 500 mg oral tablet: 1 tab(s) orally 2 times a day  prednisoLONE 10 mg oral tablet, disintegratin tab(s) orally once a day  Protonix 40 mg oral delayed release tablet: 1 tab(s) orally once a day MDD: 1

## 2025-05-01 NOTE — DISCHARGE NOTE PROVIDER - CARE PROVIDER_API CALL
Pastor Suarez  Internal Medicine  [address] [place] [address]  Phone: [phone]  Fax: [phone]  Follow Up Time: 2 weeks

## 2025-05-07 DIAGNOSIS — K21.9 GASTRO-ESOPHAGEAL REFLUX DISEASE WITHOUT ESOPHAGITIS: ICD-10-CM

## 2025-05-07 DIAGNOSIS — Z88.2 ALLERGY STATUS TO SULFONAMIDES: ICD-10-CM

## 2025-05-07 DIAGNOSIS — I48.91 UNSPECIFIED ATRIAL FIBRILLATION: ICD-10-CM

## 2025-05-07 DIAGNOSIS — I10 ESSENTIAL (PRIMARY) HYPERTENSION: ICD-10-CM

## 2025-05-07 DIAGNOSIS — M79.7 FIBROMYALGIA: ICD-10-CM

## 2025-05-07 DIAGNOSIS — E66.9 OBESITY, UNSPECIFIED: ICD-10-CM

## 2025-05-07 DIAGNOSIS — G62.9 POLYNEUROPATHY, UNSPECIFIED: ICD-10-CM

## 2025-05-07 DIAGNOSIS — M35.3 POLYMYALGIA RHEUMATICA: ICD-10-CM

## 2025-05-07 DIAGNOSIS — Z91.040 LATEX ALLERGY STATUS: ICD-10-CM

## 2025-05-07 DIAGNOSIS — Z91.09 OTHER ALLERGY STATUS, OTHER THAN TO DRUGS AND BIOLOGICAL SUBSTANCES: ICD-10-CM

## 2025-05-07 DIAGNOSIS — Z79.82 LONG TERM (CURRENT) USE OF ASPIRIN: ICD-10-CM

## 2025-05-07 DIAGNOSIS — R42 DIZZINESS AND GIDDINESS: ICD-10-CM

## 2025-05-07 DIAGNOSIS — M51.369 OTHER INTERVERTEBRAL DISC DEGENERATION, LUMBAR REGION WITHOUT MENTION OF LUMBAR BACK PAIN OR LOWER EXTREMITY PAIN: ICD-10-CM

## 2025-05-07 DIAGNOSIS — Z90.710 ACQUIRED ABSENCE OF BOTH CERVIX AND UTERUS: ICD-10-CM

## 2025-05-07 DIAGNOSIS — E04.1 NONTOXIC SINGLE THYROID NODULE: ICD-10-CM

## 2025-09-10 ENCOUNTER — LABORATORY RESULT (OUTPATIENT)
Age: 85
End: 2025-09-10

## 2025-09-10 ENCOUNTER — RESULT CHARGE (OUTPATIENT)
Age: 85
End: 2025-09-10

## 2025-09-15 LAB — BACTERIA UR CULT: ABNORMAL

## 2025-09-15 RX ORDER — CEPHALEXIN 500 MG/1
500 CAPSULE ORAL TWICE DAILY
Qty: 14 | Refills: 0 | Status: ACTIVE | COMMUNITY
Start: 2025-09-15 | End: 1900-01-01